# Patient Record
Sex: MALE | Race: WHITE
[De-identification: names, ages, dates, MRNs, and addresses within clinical notes are randomized per-mention and may not be internally consistent; named-entity substitution may affect disease eponyms.]

---

## 2018-03-10 ENCOUNTER — HOSPITAL ENCOUNTER (EMERGENCY)
Dept: HOSPITAL 80 - FED | Age: 38
Discharge: HOME | End: 2018-03-10
Payer: COMMERCIAL

## 2018-03-10 VITALS — SYSTOLIC BLOOD PRESSURE: 111 MMHG | HEART RATE: 87 BPM | DIASTOLIC BLOOD PRESSURE: 79 MMHG | RESPIRATION RATE: 18 BRPM

## 2018-03-10 VITALS — OXYGEN SATURATION: 96 %

## 2018-03-10 VITALS — TEMPERATURE: 97.5 F

## 2018-03-10 DIAGNOSIS — F17.200: ICD-10-CM

## 2018-03-10 DIAGNOSIS — R07.9: Primary | ICD-10-CM

## 2018-03-10 LAB — PLATELET # BLD: 251 10^3/UL (ref 150–400)

## 2018-03-10 NOTE — EDPHY
H & P


Stated Complaint: chest and back pain and SOB starting at 0530





- Personal History


Current Tetanus/Diphtheria Vaccine: Yes


Current Tetanus Diphtheria and Acellular Pertussis (TDAP): Yes


Tetanus Vaccine Date: < 10 years





- Medical/Surgical History


Hx Asthma: No


Hx Chronic Respiratory Disease: No


Hx Diabetes: No


Hx Cardiac Disease: No


Hx Renal Disease: No


Hx Cirrhosis: No


Hx Alcoholism: No


Hx HIV/AIDS: No


Hx Splenectomy or Spleen Trauma: No


Other PMH: lung issues.  depression, PTSD





- Social History


Smoking Status: Current every day smoker


Time Seen by Provider: 03/10/18 10:54


HPI/ROS: 





Chief complaint:  Chest pain





History of present illness:  This is a 38-year-old male who presents to the 

emergency department for evaluation of chest pain.  He reports the onset of 

symptoms this morning.  In addition to the pain in his chest he has some back 

pain between his scapula.  He has had associated shortness of breath.  He 

denies precipitating factors.  He denies alleviating or aggravating factors.  

He describes it as a squeezing pain.  He denies other associated signs or 

symptoms including no fevers, no cold symptoms, no nausea or vomiting, no pain 

or swelling in the legs.  He has never had similar.





Review of systems:  A 10 point review of systems was obtained and other than 

described above was negative (John Moreno)





- Physical Exam


Exam: 





General Appearance: Alert, nontoxic.


Eyes: Pupils equal and round no pallor or injection.


ENT, Mouth: Mucous membranes moist.


Respiratory: There are no retractions, lungs are clear to auscultation.


Cardiovascular:  Regular rate and rhythm.


Gastrointestinal:  Abdomen is soft and non tender, no masses, bowel sounds 

normal.


Neurological:  Alert and oriented x4.  Cranial nerves 2-12 grossly intact.  

Strength and sensation intact and symmetrical.


Skin: Warm and dry, no rashes.


Musculoskeletal: Neck is supple non tender. Extremities are symmetrical, full 

range of motion.


Psychiatric: Patient is oriented X 3, there is no agitation.


 (John Moreno)


Constitutional: 


 Initial Vital Signs











Temperature (C)  36.4 C   03/10/18 10:45


 


Heart Rate  88   03/10/18 10:45


 


Respiratory Rate  20   03/10/18 10:45


 


Blood Pressure  123/81 H  03/10/18 10:45


 


O2 Sat (%)  97   03/10/18 10:45








 











O2 Delivery Mode               Room Air














Allergies/Adverse Reactions: 


 





No Known Allergies Allergy (Unverified 03/10/18 10:45)


 








Home Medications: 














 Medication  Instructions  Recorded


 


Prozac 20 MG (*)  03/10/18


 


Seroquel  03/10/18














Medical Decision Making





- Diagnostics


Imaging: Discussed imaging studies w/ On call Radiologist





- Diagnostics


EKG Interpretation: 





EKG:  Complete interpretation has been separately recorded in the [facility] 

archive.  Summary impression:  Sinus rhythm, rate 91 (Nawaf Renee)


Imaging Results: 


 Imaging Impressions





Chest X-Ray  03/10/18 11:00


Impression: Normal chest x-ray.








Chest/Thorax CTA  03/10/18 12:18


Impression: 


1. No evidence of pulmonary embolus using CT protocol.


2. Thickening and distention of the esophagus. Consider underlying reflux and 

esophagitis.


3. Mild anterior wedging superior endplate of T5, T6, and T8. Clinical 

correlation recommended with respect to any prior trauma to account for these 

findings. If indicated, consider DEXA scan at some point to evaluate underlying 

bone mineral density. 


 


Findings discussed with AISSATOU Barry  at  13:08 hour, 3/10/2018.


 


 











ED Course/Re-evaluation: 





Patient is discussed with my secondary supervising physician Dr. Daniel Renee.  

Patient presents to the emergency department for chest and back discomfort.  He 

is nontoxic.  Vital signs are stable.  Initial blood studies are unremarkable 

except D-dimer which is elevated.  A CTA is performed.  He does have a 

thickened esophagus I do believe this would be consistent with his symptoms.  

Some mild wedging of T5, T6, T8.  He denies any actual spine pain.  No history 

of recent trauma.  He is given a GI cocktail with improvement in symptoms.  He 

will be discharged home.  Asked to take Zantac regularly.  He is asked to 

follow up with a primary care doctor for recheck.  Return precautions are given.

 (John Moreno)


Differential Diagnosis: 





Included but not limited to reflux, esophagitis, pulmonary infections, 

pneumothorax, cardiac dysrhythmia, ACS, pulmonary embolism (John Moreno)





- Data Points


Laboratory Results: 


 Laboratory Results





 03/10/18 11:20 





 03/10/18 11:20 





 











  03/10/18 03/10/18 03/10/18





  11:20 11:20 11:20


 


WBC      11.10 10^3/uL H 10^3/uL





     (3.80-9.50) 


 


RBC      5.32 10^6/uL 10^6/uL





     (4.40-6.38) 


 


Hgb      13.7 g/dL g/dL





     (13.7-17.5) 


 


Hct      41.2 % %





     (40.0-51.0) 


 


MCV      77.4 fL L fL





     (81.5-99.8) 


 


MCH      25.8 pg L pg





     (27.9-34.1) 


 


MCHC      33.3 g/dL g/dL





     (32.4-36.7) 


 


RDW      16.0 % H %





     (11.5-15.2) 


 


Plt Count      251 10^3/uL 10^3/uL





     (150-400) 


 


MPV      10.6 fL fL





     (8.7-11.7) 


 


Neut % (Auto)      72.3 % %





     (39.3-74.2) 


 


Lymph % (Auto)      16.9 % %





     (15.0-45.0) 


 


Mono % (Auto)      9.2 % %





     (4.5-13.0) 


 


Eos % (Auto)      0.6 % %





     (0.6-7.6) 


 


Baso % (Auto)      0.3 % %





     (0.3-1.7) 


 


Nucleat RBC Rel Count      0.0 % %





     (0.0-0.2) 


 


Absolute Neuts (auto)      8.02 10^3/uL H 10^3/uL





     (1.70-6.50) 


 


Absolute Lymphs (auto)      1.88 10^3/uL 10^3/uL





     (1.00-3.00) 


 


Absolute Monos (auto)      1.02 10^3/uL H 10^3/uL





     (0.30-0.80) 


 


Absolute Eos (auto)      0.07 10^3/uL 10^3/uL





     (0.03-0.40) 


 


Absolute Basos (auto)      0.03 10^3/uL 10^3/uL





     (0.02-0.10) 


 


Absolute Nucleated RBC      0.00 10^3/uL 10^3/uL





     (0-0.01) 


 


Immature Gran %      0.7 % %





     (0.0-1.1) 


 


Immature Gran #      0.08 10^3/uL 10^3/uL





     (0.00-0.10) 


 


D-Dimer  2.51 ug/mLFEU H ug/mLFEU    





   (0.00-0.50)   


 


Sodium    142 mEq/L mEq/L  





    (135-145)  


 


Potassium    3.8 mEq/L mEq/L  





    (3.5-5.2)  


 


Chloride    103 mEq/L mEq/L  





    ()  


 


Carbon Dioxide    25 mEq/l mEq/l  





    (22-31)  


 


Anion Gap    14 mEq/L mEq/L  





    (8-16)  


 


BUN    24 mg/dL H mg/dL  





    (7-23)  


 


Creatinine    1.1 mg/dL mg/dL  





    (0.7-1.3)  


 


Estimated GFR    > 60   





    


 


Glucose    98 mg/dL mg/dL  





    ()  


 


Calcium    8.8 mg/dL mg/dL  





    (8.5-10.4)  


 


Troponin I    < 0.012 ng/mL ng/mL  





    (0.000-0.034)  











Medications Given: 


 








Discontinued Medications





Al Hydroxide/Mg Hydroxide (Maalox Susp)  30 ml PO ONCE ONE


   Stop: 03/10/18 13:50


   Last Admin: 03/10/18 13:53 Dose:  30 ml


Hyoscyamine Sulfate (Levsin, Hyomax-Sl)  0.25 mg PO ONCE ONE


   Stop: 03/10/18 13:50


   Last Admin: 03/10/18 13:54 Dose:  0.25 mg


Lidocaine (Lidocaine 2% Viscous)  15 ml PO ONCE ONE


   Stop: 03/10/18 13:50


   Last Admin: 03/10/18 13:54 Dose:  15 ml


Morphine Sulfate (Morphine)  4 mg IVP EDNOW ONE


   Stop: 03/10/18 11:02


   Last Admin: 03/10/18 11:20 Dose:  4 mg


Morphine Sulfate (Morphine)  4 mg IVP EDNOW ONE


   Stop: 03/10/18 12:03


   Last Admin: 03/10/18 12:11 Dose:  4 mg


Ondansetron HCl (Zofran)  4 mg IVP EDNOW ONE


   Stop: 03/10/18 11:20


   Last Admin: 03/10/18 11:20 Dose:  4 mg








Departure





- Departure


Disposition: Home, Routine, Self-Care


Clinical Impression: 


 Chest pain





Condition: Good


Instructions:  Chest Pain (ED)


Additional Instructions: 


Follow-up with your primary care doctor for recheck.  Please have them recheck 

all findings from the emergency room





Take Zantac 150 mg twice daily for the next 2 weeks





If symptoms worsen or new symptoms develop return to the emergency room for 

recheck


Referrals: 


[facility] [Primary Care Provider] - As per Instructions

## 2018-03-10 NOTE — CPEKG
Heart Rate: 91

RR Interval: 659

P-R Interval: 140

QRSD Interval: 84

QT Interval: 360

QTC Interval: 443

P Axis: 57

QRS Axis: 41

T Wave Axis: 10

EKG Severity - NORMAL ECG -

EKG Impression: SINUS RHYTHM

Electronically Signed By: Nawaf Renee 10-Mar-2018 11:16:51

## 2018-03-12 ENCOUNTER — HOSPITAL ENCOUNTER (EMERGENCY)
Dept: HOSPITAL 80 - FED | Age: 38
Discharge: HOME | End: 2018-03-12
Payer: COMMERCIAL

## 2018-03-12 VITALS
HEART RATE: 84 BPM | SYSTOLIC BLOOD PRESSURE: 122 MMHG | DIASTOLIC BLOOD PRESSURE: 65 MMHG | RESPIRATION RATE: 16 BRPM | TEMPERATURE: 98.6 F | OXYGEN SATURATION: 94 %
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
## 2018-03-12 DIAGNOSIS — S29.012A: Primary | ICD-10-CM

## 2018-03-12 DIAGNOSIS — F17.200: ICD-10-CM

## 2018-03-12 NOTE — EDPHY
H & P


Time Seen by Provider: 03/12/18 15:49


HPI/ROS: 





CHIEF COMPLAINT:  Continued thoracic back pain





HISTORY OF PRESENT ILLNESS:  30-year-old male seen emergency department 2 days 

ago for complaints of thoracic back pain and chest pain which of he had an 

elevated D-dimer, subsequently had CT angiography of the chest which was 

negative for PE or vasculopathy.  He has been taking ibuprofen with mild relief 

of symptoms.  Complaining of thoracic back pain in the paraspinous region which 

is reproducible with movement.  Nonpleuritic.  No trauma.  No syncope no near 

syncope.  No peripheral paresthesia, weakness, numbness.





PRIMARY CARE PROVIDER: Panchito Rinaldi 





REVIEW OF SYSTEMS:


A ten point review of systems was performed and is negative with the exception 

of the items mentioned in the HPI








PAST MEDICAL & SURGICAL  HISTORY:  No pertinent medical or surgical history    





SOCIAL HISTORY: works as a   














************


PHYSICAL EXAM





(Prior to examination, patient consented to physical exam, hands were washed 

and my usual and customary physical exam procedures followed)


1) GENERAL: Well-developed, well-nourished, alert and oriented.  Appears 

uncomfortable when he is asked to sit up..


2) HEAD: Normocephalic, atraumatic


3) HEENT: Pupils equal, round, reactive to light bilaterally.  Sclera 

anicteric. 


4) NECK: Full range of motion, no meningeal signs.


5) LUNGS: Clear auscultation bilaterally, no wheezes, no rhonchi, no 

retractions.   


6) HEART: Regular rate and rhythm, no murmur, no heave, no gallop.


7) ABDOMEN: No guarding, no rebound, no focal tenderness n,


8) MUSCULOSKELETAL: Moving all extremities, no focal areas of tenderness, no 

obvious trauma.  No peripheral edema or discoloration.


9) BACK: No CVA tenderness, no midline vertebral tenderness, no fluctuance, no 

step-off, no obvious trauma, no visual or palpable abnormality. 


10) SKIN: No rash, no petechiae. 


11) Psychiatric:  Patient is oriented X 3, there is no agitation.


12) NEURO: Awake, alert, and oriented to person, place and time.  Answers 

questions appropriately.  There were no obvious focal neurologic abnormalities.

  No cerebellar dysfunction.  Normal steady gait.  Upper and lower extremities 

bilaterally with strength 5 / 5, reflexes 2+.





***************





DIFFERENTIAL DIAGNOSIS:   In no particular include but limited to spinal 

infectious etiology, acute muscular strain, discogenic etiology





Smoking Status: Current every day smoker


Constitutional: 





 Initial Vital Signs











Temperature (C)  37 C   03/12/18 14:40


 


Heart Rate  84   03/12/18 14:40


 


Respiratory Rate  16   03/12/18 14:40


 


Blood Pressure  122/65 H  03/12/18 14:40


 


O2 Sat (%)  94   03/12/18 14:40








 











O2 Delivery Mode               Room Air














Allergies/Adverse Reactions: 


 





No Known Allergies Allergy (Verified 03/12/18 14:38)


 








Home Medications: 














 Medication  Instructions  Recorded


 


Prozac 20 MG (*)  03/10/18


 


Seroquel  03/10/18


 


Cyclobenzaprine [Flexeril 10 MG 10 mg PO TID #15 tab 03/12/18





(RX)]  


 


Lidocaine [Lidoderm] 1 each TP BID #30 adh..patch 03/12/18














MDM/Departure





- [facility]


ED Course/Re-evaluation: 





3:54 p.m.:  I reviewed the patient's old medical records including his imaging 

studies.  He had CT angiography of the chest 2 days ago showing no acute 

abnormality, no evidence of dissection or pulmonary embolus.  On exam in the ER 

today he has no midline thoracic spine pain.  He is tender to palpation 

bilateral paraspinous thoracic region.   Discussed his wedge abnormalities seen 

on CT scan however absence of midline pain I think that this is less than 

likely specific etiology of his pain.We discussed that I think his symptoms arm 

more than likely secondary to acute muscular strain.  Plan will be discharged 

with Lidoderm patches, Flexeril, cold packs and follow up at [facility].  He feels 

comfortable with this plan.  All questions and concerns addressed by myself.  

Care of patient under supervision of secondary supervising physician Dr Crisostomo. 





- Depart


Disposition: Home, Routine, Self-Care


Clinical Impression: 


 Strain of muscle and tendon of back wall of thorax, initial encounter





Condition: Good


Instructions:  Thoracic Back Strain (ED)


Additional Instructions: 


Seek medical attention if you develop new or worsening pain, if you develop 

bladder or bowel dysfunction, numbness around your perineum, foot drop, or any 

other symptoms that concern you. 


Prescriptions: 


Cyclobenzaprine [Flexeril 10 MG (RX)] 10 mg PO TID #15 tab


Lidocaine [Lidoderm] 1 each TP BID #30 adh..patch


Referrals: 


[facility] [Provider Group] - 2-3 days, call for appt.

## 2018-03-28 ENCOUNTER — HOSPITAL ENCOUNTER (EMERGENCY)
Dept: HOSPITAL 80 - FED | Age: 38
Discharge: HOME | End: 2018-03-28
Payer: MEDICAID

## 2018-03-28 VITALS — DIASTOLIC BLOOD PRESSURE: 81 MMHG | SYSTOLIC BLOOD PRESSURE: 115 MMHG

## 2018-03-28 DIAGNOSIS — R56.9: Primary | ICD-10-CM

## 2018-03-28 LAB — PLATELET # BLD: 277 10^3/UL (ref 150–400)

## 2018-03-28 NOTE — CPEKG
Heart Rate: 113

RR Interval: 531

P-R Interval: 124

QRSD Interval: 82

QT Interval: 320

QTC Interval: 439

P Axis: 56

QRS Axis: 53

T Wave Axis: -2

EKG Severity - BORDERLINE ECG -

EKG Impression: SINUS TACHYCARDIA

EKG Impression: BORDERLINE T ABNORMALITIES, INFERIOR LEADS

Electronically Signed By: Kaylyn Crisostomo 29-Mar-2018 22:44:22

## 2018-03-28 NOTE — EDPHY
HPI/HX/ROS/PE/MDM


Narrative: 





CHIEF COMPLAINT: Seizure 





HPI: 


This patient is a 38 year old male arriving via EMS following a witnessed 

seizure this morning. His wife heard a crashing sound, which woke her from 

sleep. She found the patient rigid on the ground with tonic-clonic seizure 

activity. This lasted about 3.5 to 4 minutes. She states that as he became less 

rigid, he continued "seizing and grunting" for another minute. The patient had 

an episode last night of confusion and disorientation while at work, and he is 

not sure if he had another seizure at that time. He was evaluated 20 years ago 

at age 19 for possible seizures for which he took antiepileptics, but a 

neurologist at that time found no evidence of seizure disorder and he was taken 

off his seizure medication. Currently, the patient continues to feel slightly 

confused. He denies benzodiazepine or alcohol use. Per EMS report, BGL was 

within normal limits, vitals stable in transport.He denies fever, headache, 

neck pain, chest pain, shortness of breath, weakness, or other associated 

symptoms. 





REVIEW OF SYSTEMS:


Aside from elements discussed in the HPI, a comprehensive 10-point review of 

systems was reviewed and is negative.





PMH: Depression, PTSD (Seroquel, Prozac). 





SOCIAL HISTORY: . Wife at bedside. Recently moved to [place]. Employed. 





PHYSICAL EXAM:


General:Patient is alert, in no acute distress.


ENT:Eyes are normal to inspection. Lateral tongue abrasions. ENT inspection 

otherwise normal.


Neck: Normal inspection.  Full range of motion.


Respiratory:No respiratory distress.  Breath sounds normal bilaterally.


Cardiovascular: Regular rate and rhythm.  Strong peripheral pulses.  Normal cap 

refill.


Abdomen:The abdomen is nontender to palpation. There are no peritoneal signs. 

There are normal bowel sounds.


Back: Normal to inspection.  No tenderness to palpation.


Skin: Normal color.  No rash.  Warm and dry.


Extremities: Normal appearance.  Full range of motion.


Neuro: Oriented x3.  Normal motor function.  Normal sensory function.





ED Course: 





09:15 Met EMS at bedside. 





37 y/o male presents following witnessed tonic-clonic seizure activity this 

morning. Exam reveals lateral tongue abrasions. Plan for CT head. Plan for EKG, 

labs including CBC, chemistries, troponin. IV established. Plan to administer 

1L IV NS. 





EKG was ordered and interpreted by myself.  Please see [facility] system for 

official reading. Sinus tachycardia, borderline T abnormalities in inferior 

leads. 





09:47 Spoke with Dr. Oppenheimer, radiologist. CT head negative for acute 

processes. 





10:00 Plan to administer 600mg PO Ibuprofen for pain relief. 





Reviewed laboratory studies. Troponin negative. Plan to consult with 

neurologist on call prior to patient discharge. 





11:18 Spoke with Dr. Smallwood, neurologist. We disussed the fact that patient 

had previously been on an antiepileptic. He will follow up with this patient in 

an outpatient setting. He does not recommending starting medication for seizure 

prevention at this time. 





Reassessed patient and discussed results. Plan to discharge patient home in 

good condition. He will follow up up with neurology as above. Return 

precautions discussed. He is comfortable with this plan. 


MDM: 





This patient presents with witnessed tonic-clonic seizure in setting of 

previous history of seizures, not currently on meds. CTH is negative for 

abnormalities.  Patient has returned to normal mental status and there are no 

signs of trauma.  I think he is safe for outpatient management.  He was advised 

not to drive etc. 





- Data Points


Imaging Results: 


 Imaging Impressions





Head CT  03/28/18 09:18


Impression: 1. Head CT within normal limits. No seizure focus identified.


2. Right maxillary sinus air-fluid level of undetermined etiology.


 


Results called and discussed with Jc Preston MD, at 3/28/2018 9:47


 


General information for patients regarding this examination can be found at 

[email or web address].


 


If you have questions or comments about this report, please contact me at [phone] (hospital) or [phone] (cell). 











Imaging: Discussed imaging studies w/ On call Radiologist


Laboratory Results: 


 Laboratory Results





 03/28/18 09:15 





 03/28/18 09:15 





 











  03/28/18 03/28/18





  09:15 09:15


 


WBC    11.20 10^3/uL H 10^3/uL





    (3.80-9.50) 


 


RBC    5.24 10^6/uL 10^6/uL





    (4.40-6.38) 


 


Hgb    13.7 g/dL g/dL





    (13.7-17.5) 


 


Hct    43.2 % %





    (40.0-51.0) 


 


MCV    82.4 fL fL





    (81.5-99.8) 


 


MCH    26.1 pg L pg





    (27.9-34.1) 


 


MCHC    31.7 g/dL L g/dL





    (32.4-36.7) 


 


RDW    16.2 % H %





    (11.5-15.2) 


 


Plt Count    277 10^3/uL 10^3/uL





    (150-400) 


 


MPV    10.4 fL fL





    (8.7-11.7) 


 


Neut % (Auto)    72.5 % %





    (39.3-74.2) 


 


Lymph % (Auto)    17.3 % %





    (15.0-45.0) 


 


Mono % (Auto)    6.4 % %





    (4.5-13.0) 


 


Eos % (Auto)    1.8 % %





    (0.6-7.6) 


 


Baso % (Auto)    0.4 % %





    (0.3-1.7) 


 


Nucleat RBC Rel Count    0.0 % %





    (0.0-0.2) 


 


Absolute Neuts (auto)    8.11 10^3/uL H 10^3/uL





    (1.70-6.50) 


 


Absolute Lymphs (auto)    1.94 10^3/uL 10^3/uL





    (1.00-3.00) 


 


Absolute Monos (auto)    0.72 10^3/uL 10^3/uL





    (0.30-0.80) 


 


Absolute Eos (auto)    0.20 10^3/uL 10^3/uL





    (0.03-0.40) 


 


Absolute Basos (auto)    0.05 10^3/uL 10^3/uL





    (0.02-0.10) 


 


Absolute Nucleated RBC    0.00 10^3/uL 10^3/uL





    (0-0.01) 


 


Immature Gran %    1.6 % H %





    (0.0-1.1) 


 


Immature Gran #    0.18 10^3/uL H 10^3/uL





    (0.00-0.10) 


 


Sodium  144 mEq/L mEq/L  





   (135-145)  


 


Potassium  4.4 mEq/L mEq/L  





   (3.5-5.2)  


 


Chloride  104 mEq/L mEq/L  





   ()  


 


Carbon Dioxide  17 mEq/l L mEq/l  





   (22-31)  


 


Anion Gap  23 mEq/L H mEq/L  





   (8-16)  


 


BUN  24 mg/dL H mg/dL  





   (7-23)  


 


Creatinine  1.1 mg/dL mg/dL  





   (0.7-1.3)  


 


Estimated GFR  > 60   





   


 


Glucose  133 mg/dL H mg/dL  





   ()  


 


Calcium  8.7 mg/dL mg/dL  





   (8.5-10.4)  


 


Troponin I  < 0.012 ng/mL ng/mL  





   (0.000-0.034)  











Medications Given: 


 








Discontinued Medications





Sodium Chloride (Ns)  1,000 mls @ 0 mls/hr IV ONCE ONE


   PRN Reason: Wide Open


   Stop: 03/28/18 09:24


   Last Admin: 03/28/18 09:24 Dose:  1,000 mls


Ibuprofen (Motrin)  600 mg PO EDNOW ONE


   Stop: 03/28/18 10:01


   Last Admin: 03/28/18 10:45 Dose:  600 mg








General


Initial Vital Signs: 


 Initial Vital Signs











Temperature (C)  36.6 C   03/28/18 09:13


 


Heart Rate  120 H  03/28/18 09:13


 


Respiratory Rate  18   03/28/18 09:13


 


Blood Pressure  134/86 H  03/28/18 09:13


 


O2 Sat (%)  95   03/28/18 09:13








 











O2 Delivery Mode               Room Air














Allergies/Adverse Reactions: 


 





No Known Allergies Allergy (Verified 03/12/18 14:38)


 








Home Medications: 














 Medication  Instructions  Recorded


 


Prozac 20 MG (*)  03/10/18


 


Seroquel  03/10/18














Departure





- Departure


Disposition: Home, Routine, Self-Care


Clinical Impression: 


 Seizure





Condition: Good


Instructions:  New-Onset Seizure in Adults (ED)


Additional Instructions: 


1. Follow-up with a neurologist within 72 hours. Additional testing will likely 

need to be done for evaluation of your seizure.





2. Return to the Emergency Department for recurrent seizure, fever, headache, 

confusion, numbness, weakness, or other concerns.





3. No driving or dangerous activity such as swimming in a pool or riding a ski 

lift which could put you or someone else a danger in the event of recurrent 

seizure until you are cleared by a neurologist.


Referrals: 


Chapito Smallwood DO [Doctor of Osteopathy] - As per Instructions


Report Scribed for: Jc Preston


Report Scribed by: Fallon Yeh


Date of Report: 03/28/18


Time of Report: 10:27


Physician Review and Approval Statement: Portions of this note were transcribed 

by an ED scribe.  I personally performed the history, physical exam, and 

medical decision making; and confirm the accuracy of the information in the 

transcribed note.

## 2018-03-29 ENCOUNTER — HOSPITAL ENCOUNTER (EMERGENCY)
Dept: HOSPITAL 80 - FED | Age: 38
Discharge: HOME | End: 2018-03-29
Payer: MEDICAID

## 2018-03-29 VITALS — DIASTOLIC BLOOD PRESSURE: 89 MMHG | SYSTOLIC BLOOD PRESSURE: 125 MMHG
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
## 2018-03-29 DIAGNOSIS — F17.200: ICD-10-CM
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
## 2018-03-29 DIAGNOSIS — M79.1: ICD-10-CM
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
## 2018-03-29 DIAGNOSIS — S00.512A: Primary | ICD-10-CM

## 2018-03-29 NOTE — EDPHY
H & P


Time Seen by Provider: 03/29/18 19:56


HPI/ROS: 





CHIEF COMPLAINT:  Sore all over, tongue swelling after seizure yesterday





HISTORY OF PRESENT ILLNESS:  38-year-old male presents with multiple 

complaints.  He had a generalized seizure yesterday and bit his tongue.  Since 

then he has had difficulty speaking because of tongue swelling and states that 

his family is unable to understand is speaking.  He also complains of diffuse 

myalgias.  He is taking ibuprofen 800 mg every 8 hr for myalgias with some 

relief.  He also has a fuzzy feeling in his brain and feels that he needs to 

take an extra 1-2 seconds to respond to questions.  No headache and no 

recurrent seizure.  He also feels somewhat depressed because he has been unable 

to see his daughter who lives in another [place].  Denies suicidal or homicidal 

ideation.





REVIEW OF SYSTEMS:


Constitutional:  No fever, no chills


Eyes:  No visual changes


ENT:  No sore throat


Respiratory:  No cough, no shortness of breath


Cardiac:  No chest pain


Gastrointestinal:  No nausea, no vomiting, no abdominal pain


Genitourinary:  no dysuria


Skin:  No rash


Neurological:  No headache, no numbness, no weakness


Psychiatric:  depression





Past Medical/Surgical History: 





Seizure disorder


Depression





Social History: 











Smoking Status: Current every day smoker


Physical Exam: 





General Appearance:  Alert, pleasant, speech is clear


Eyes:  Pupils equal and round, no conjunctival pallor or injection


ENT, Mouth:  Mucous membranes moist


Neck:  Normal inspection


Respiratory:  Lungs are clear to auscultation


Cardiovascular:  Regular rate and rhythm


Gastrointestinal:  Abdomen is soft and nontender


Neurological:  Alert, oriented x3, cranial nerves II through XII intact, motor 5

/5, sensory intact to light touch, normal gait.


Skin:  Warm and dry


Extremities:  Diffuse mild tenderness, no swelling


Psychiatric:  Mood and affect normal, normal thought process





Constitutional: 





 Initial Vital Signs











Temperature (C)  36.6 C   03/29/18 20:00


 


Heart Rate  82   03/29/18 20:00


 


Respiratory Rate  18   03/29/18 20:00


 


Blood Pressure  138/96 H  03/29/18 20:00


 


O2 Sat (%)  97   03/29/18 20:00








 











O2 Delivery Mode               Room Air














Allergies/Adverse Reactions: 


 





No Known Allergies Allergy (Verified 03/12/18 14:38)


 








Home Medications: 














 Medication  Instructions  Recorded


 


Prozac 20 MG (*)  03/10/18


 


Seroquel  03/10/18














Medical Decision Making


ED Course/Re-evaluation: 





This patient presents with a tongue abrasion and myalgias after a generalized 

seizure yesterday.  Old medical record from yesterday's ED visit reviewed by 

me.  His speech is clearly understandable and the tongue abrasion is minor.  He 

also has diffuse myalgias after the seizure.  I do not suspect rhabdomyolysis 

and do not feel that testing is indicated.  I have encouraged him to take 

ibuprofen as needed for myalgias.





Departure





- Departure


Disposition: Home, Routine, Self-Care


Clinical Impression: 


 Myalgia





Abrasion of tongue


Qualifiers:


 Encounter type: initial encounter Qualified Code(s): S00.512A - Abrasion of 

oral cavity, initial encounter





Condition: Good


Instructions:  Musculoskeletal Pain (ED)


Referrals: 


Sloan Garcia MD [Medical Doctor] - As per Instructions


[facility],. [Clinic] - As per Instructions

## 2018-04-10 ENCOUNTER — HOSPITAL ENCOUNTER (OUTPATIENT)
Dept: HOSPITAL 80 - FCPNEURO | Age: 38
End: 2018-04-10
Attending: PSYCHIATRY & NEUROLOGY
Payer: MEDICAID

## 2018-04-10 DIAGNOSIS — R56.9: Primary | ICD-10-CM

## 2018-04-10 NOTE — CPEEG
[f 
rep st]



                                                      ELECTROENCEPHALOGRAM





ELECTROENCEPHALOGRAM



DATE OF STUDY:  04/10/2018



DATE OF INTERPRETATION:  04/10/2018.





INTERPRETATION:  Normal EEG during wakefulness and brief drowsiness.  There 
were no potentially epileptogenic abnormalities present in the recording.  A 
repeat 4-hour video EEG study may be helpful, if clinically indicated.



REPORT:  This EEG contains 9 Hz alpha activity over the posterior head regions.
  There was no abnormal activation at rest, during photic stimulation or 
hyperventilation.  The patient became drowsy very briefly during the study.  
There was no abnormal activation during brief drowsiness or during times of 
arousal.  The patient did not fall asleep during the study. 



A repeat 4-hour video EEG may be helpful to capture sleep, if clinically 
indicated.





Job #:  [id number]

MTDD

## 2018-04-16 ENCOUNTER — HOSPITAL ENCOUNTER (OUTPATIENT)
Dept: HOSPITAL 80 - FIMAGING | Age: 38
End: 2018-04-16
Attending: PSYCHIATRY & NEUROLOGY
Payer: MEDICAID

## 2018-04-16 DIAGNOSIS — R56.9: Primary | ICD-10-CM

## 2018-04-16 PROCEDURE — A9585 GADOBUTROL INJECTION: HCPCS

## 2018-05-02 ENCOUNTER — HOSPITAL ENCOUNTER (EMERGENCY)
Dept: HOSPITAL 80 - FED | Age: 38
Discharge: HOME | End: 2018-05-02
Payer: MEDICAID

## 2018-05-02 VITALS — DIASTOLIC BLOOD PRESSURE: 72 MMHG | SYSTOLIC BLOOD PRESSURE: 126 MMHG

## 2018-05-02 DIAGNOSIS — F17.200: ICD-10-CM

## 2018-05-02 DIAGNOSIS — G40.909: Primary | ICD-10-CM

## 2018-05-02 LAB — PLATELET # BLD: 277 10^3/UL (ref 150–400)

## 2018-05-02 NOTE — ASMTCMCOM
CM Note

 

CM Note                       

Notes:

Patient expressed challenges in finding transportation to a neurology appointment in [place]. I sent 


his demographics to our Medicaid  and requested that she contact patient with 

information about Medicaid transportation. 



I gave patient and his wife bus vouchers so that they could get home from the hospital. They will 

wait to hear from [place] regarding Medicaid transport options. 

 

Date Signed:  05/02/2018 02:29 PM

Electronically Signed By:Yahaira Francisco RN

## 2018-05-02 NOTE — EDPHY
H & P


Time Seen by Provider: 05/02/18 13:06


HPI/ROS: 





CHIEF COMPLAINT:  Seizure





HISTORY OF PRESENT ILLNESS:  The patient presents the ED after reported 

witnessed recurrent seizure.  The patient has a history of seizure disorder.  

He recently restarted Keppra.  The patient has had follow up with Dr. Smallwood 

from Neurology.  The patient has not been compliant with taking his Keppra 

according to Dr. Smallwood.  The patient tells me that he did miss his morning 

dose.  There is no history of a fall or trauma.  The patient denies fever, 

cough or congestion.  He denies additional acute complaints.





REVIEW OF SYSTEMS:


A comprehensive 10 point review of systems is otherwise negative aside from 

elements mentioned in the history of present illness.








Source: Patient


Exam Limitations: No limitations





- Personal History


Tetanus Vaccine Date: < 10 years





- Medical/Surgical History


Hx Asthma: No


Hx Chronic Respiratory Disease: No


Hx Diabetes: No


Hx Cardiac Disease: No


Hx Renal Disease: No


Hx Cirrhosis: No


Hx Alcoholism: No


Hx HIV/AIDS: No


Hx Splenectomy or Spleen Trauma: No


Other PMH: lung issues.  depression, PTSD, seizures,





- Social History


Smoking Status: Current every day smoker





- Physical Exam


Exam: 








General Appearance:  Alert, no distress


Eyes:  Pupils equal and round no pallor or injection


ENT, Mouth:  Mucous membranes moist


Respiratory:  There are no retractions, lungs are clear to auscultation


Cardiovascular:  Regular rate and rhythm


Gastrointestinal:  Abdomen is soft and nontender, no masses, bowel sounds normal


Neurological:  A&O, normal motor function, normal sensory exam, normal cranial 

nerves


Skin:  Warm and dry, no rashes


Musculoskeletal:  Neck is supple nontender


Extremities:  symmetrical, full range of motion








Constitutional: 


 Initial Vital Signs











Temperature (C)  37.1 C   05/02/18 13:06


 


Heart Rate  104 H  05/02/18 13:06


 


Respiratory Rate  16   05/02/18 13:06


 


Blood Pressure  133/81 H  05/02/18 13:06


 


O2 Sat (%)  94   05/02/18 13:06








 











O2 Delivery Mode               Room Air














Allergies/Adverse Reactions: 


 





No Known Allergies Allergy (Verified 03/12/18 14:38)


 








Home Medications: 














 Medication  Instructions  Recorded


 


Prozac 20 MG (*)  03/10/18


 


Seroquel  03/10/18


 


Baclofen [Baclofen 10 mg (*)]  05/02/18


 


Keppra  05/02/18


 


Meloxicam  05/02/18














Medical Decision Making


ED Course/Re-evaluation: 





I reviewed the patient's outpatient medical records including an MRI performed 

earlier this month for recurrent seizure.  It demonstrated no evidence of acute 

disease.





The patient was given a dose of his Keppra in the ED.





I reviewed with Dr. John who informs me the patient is not a reliable and 

likely is non-compliant.





The patient will be discharged from the emergency department.  I have stressed 

to him the importance of taking his Keppra twice a day as directed.  He should 

follow up with his neurologist as scheduled.








Differential Diagnosis: 





Differential diagnosis considered includes dehydration, pseudo-seizure, epilepsy

, status epilepticus





- Data Points


Laboratory Results: 


 Laboratory Results





 05/02/18 12:45 





 05/02/18 12:45 





 











  05/02/18 05/02/18





  12:45 12:45


 


WBC    9.09 10^3/uL 10^3/uL





    (3.80-9.50) 


 


RBC    5.56 10^6/uL 10^6/uL





    (4.40-6.38) 


 


Hgb    14.5 g/dL g/dL





    (13.7-17.5) 


 


Hct    46.4 % %





    (40.0-51.0) 


 


MCV    83.5 fL fL





    (81.5-99.8) 


 


MCH    26.1 pg L pg





    (27.9-34.1) 


 


MCHC    31.3 g/dL L g/dL





    (32.4-36.7) 


 


RDW    15.2 % %





    (11.5-15.2) 


 


Plt Count    277 10^3/uL 10^3/uL





    (150-400) 


 


MPV    10.3 fL fL





    (8.7-11.7) 


 


Neut % (Auto)    59.0 % %





    (39.3-74.2) 


 


Lymph % (Auto)    27.9 % %





    (15.0-45.0) 


 


Mono % (Auto)    8.6 % %





    (4.5-13.0) 


 


Eos % (Auto)    2.1 % %





    (0.6-7.6) 


 


Baso % (Auto)    0.7 % %





    (0.3-1.7) 


 


Nucleat RBC Rel Count    0.0 % %





    (0.0-0.2) 


 


Absolute Neuts (auto)    5.37 10^3/uL 10^3/uL





    (1.70-6.50) 


 


Absolute Lymphs (auto)    2.54 10^3/uL 10^3/uL





    (1.00-3.00) 


 


Absolute Monos (auto)    0.78 10^3/uL 10^3/uL





    (0.30-0.80) 


 


Absolute Eos (auto)    0.19 10^3/uL 10^3/uL





    (0.03-0.40) 


 


Absolute Basos (auto)    0.06 10^3/uL 10^3/uL





    (0.02-0.10) 


 


Absolute Nucleated RBC    0.00 10^3/uL 10^3/uL





    (0-0.01) 


 


Immature Gran %    1.7 % H %





    (0.0-1.1) 


 


Immature Gran #    0.15 10^3/uL H 10^3/uL





    (0.00-0.10) 


 


Sodium  149 mEq/L H mEq/L  





   (135-145)  


 


Potassium  4.3 mEq/L mEq/L  





   (3.5-5.2)  


 


Chloride  104 mEq/L mEq/L  





   ()  


 


Carbon Dioxide  16 mEq/l L mEq/l  





   (22-31)  


 


Anion Gap  29 mEq/L H mEq/L  





   (8-16)  


 


BUN  20 mg/dL mg/dL  





   (7-23)  


 


Creatinine  1.3 mg/dL mg/dL  





   (0.7-1.3)  


 


Estimated GFR  > 60   





   


 


Glucose  80 mg/dL mg/dL  





   ()  


 


Calcium  9.5 mg/dL mg/dL  





   (8.5-10.4)  














Departure





- Departure


Disposition: Home, Routine, Self-Care


Clinical Impression: 


 Seizure disorder





Condition: Good


Instructions:  Recurrent Seizures in Adults (ED)


Additional Instructions: 


1.  Follow up with your neurologist as scheduled.


2. Take your anti seizure medication twice a day as directed.  It is imperative 

that you do not miss any doses of the medications.





 


Referrals: 


JOSELUIS VELASQUEZ [Other] - As per Instructions


Chapito Smallwood DO [Doctor of Osteopathy] - As per Instructions

## 2018-06-25 ENCOUNTER — HOSPITAL ENCOUNTER (EMERGENCY)
Dept: HOSPITAL 80 - FED | Age: 38
Discharge: HOME | End: 2018-06-25
Payer: MEDICAID

## 2018-06-25 VITALS — DIASTOLIC BLOOD PRESSURE: 76 MMHG | SYSTOLIC BLOOD PRESSURE: 122 MMHG

## 2018-06-25 DIAGNOSIS — F43.10: Primary | ICD-10-CM

## 2018-06-25 DIAGNOSIS — F17.200: ICD-10-CM

## 2018-06-25 LAB — PLATELET # BLD: 263 10^3/UL (ref 150–400)

## 2018-06-25 PROCEDURE — G0480 DRUG TEST DEF 1-7 CLASSES: HCPCS

## 2018-06-25 NOTE — EDPHY
H & P


Stated Complaint: FLASH BACKS/PTSD


Source: Patient





- Personal History


Current Tetanus Diphtheria and Acellular Pertussis (TDAP): Yes


Tetanus Vaccine Date: < 10 years





- Medical/Surgical History


Hx Asthma: No


Hx Chronic Respiratory Disease: No


Hx Diabetes: No


Hx Cardiac Disease: No


Hx Renal Disease: No


Hx Cirrhosis: No


Hx Alcoholism: Yes


Hx HIV/AIDS: No


Hx Splenectomy or Spleen Trauma: No


Other PMH: lung issues.  depression, PTSD, seizures, FX SPINE





- Social History


Smoking Status: Heavy smoker


Time Seen by Provider: 06/25/18 06:43


HPI/ROS: 





HPI





CHIEF COMPLAINT: Anxiety, PTSD, Depression





HISTORY OF PRESENT ILLNESS:  Patient is a 38-year-old male, he suffers from 

depression, as well as anxiety and PTSD, he presents to the emergency room by 

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  This happened approximately an hour ago.  

This altercation upset him he started stating that he felt like his PTS was 

acting up and he felt very anxious and he drove here to the emergency room.  He 

is requesting to speak to mental health.  He additionally reports that he has 

been drinking alcohol more often.  He denies current suicidal ideation but does 

state that he suffers from depression.  He is feeling more depressed and is 

requesting to speak to mental health.  He is a patient [facility].





Past Medical History:  PTSD, anxiety, depression





Past Surgical History:  No recent surgery





Social History:  Lives locally, frequent alcohol use, smokes tobacco, denies 

illicit drugs.





Family History:  Noncontributory








ROS   


REVIEW OF SYSTEMS:


A comprehensive 10 point review of systems is otherwise negative aside from 

elements mentioned in the history of present illness.








Exam   


Constitutional   nontoxic appearing in no acute distress, slightly anxious 

triage nursing summary reviewed, vital signs reviewed, awake/alert. 


Eyes   normal conjunctivae and sclera, EOMI, PERRLA. 


HENT   normal inspection, atraumatic, moist mucus membranes, no epistaxis, neck 

supple/ no meningismus, no raccoon eyes. 


Respiratory   clear to auscultation bilaterally, normal breath sounds, no 

respiratory distress, no wheezing. 


Cardiovascular   rate normal, regular rhythm, no murmur, no edema, distal 

pulses normal. 


Gastrointestinal   soft, non-tender, no rebound, no guarding, normal bowel 

sounds, no distension, no pulsatile mass. 


Genitourinary   no CVA tenderness. 


Musculoskeletal  no midline vertebral tenderness, full range of motion, no calf 

swelling, no tenderness of extremities, no meningismus, good pulses, 

neurovascularly intact.


Skin   pink, warm, & dry, no rash, skin atraumatic. 


Neurologic   awake, alert and oriented x 3, AAOx3, moves all 4 extremities 

equally, motor intact, sensory intact, CN II-XII intact, normal cerebellar, 

normal vision, normal speech. 


Psychiatric  slightly anxious, depressed


Heme/Lymph/Immune   no lymphadenopathy.





Differential Diagnosis:  Includes but is not limited to in a particular order:  

Acute anxiety, PTSD, depression, mood disorder, underlying mental illness.  

Substance abuse.





Medical Decision Making:  Plan for this patient will obtain blood work and drug 

screen for medical clearance.  Patient is voluntary.  He would like to speak to 

mental health.  He is a patient [facility].  He is not suicidal I 

do not feel like he would benefit from a M1 hold.  Will medically clear him and 

then he can meet with mental health for follow-up care in resources.  1 mg 

Ativan p.o. As been ordered as well.





Re-evaluation:








0646:  1 mg p. o. Ativan ordered.


0700:  Signed over to Dr. Renee 7am Shift-change.  Pending blood work drug 

screen.  And would like to speak to mental health.   (Ismael Marquez)


Constitutional: 


 Initial Vital Signs











Temperature (C)  36.5 C   06/25/18 06:29


 


Heart Rate  96   06/25/18 06:29


 


Respiratory Rate  16   06/25/18 06:29


 


Blood Pressure  134/93 H  06/25/18 06:29


 


O2 Sat (%)  97   06/25/18 06:29








 











O2 Delivery Mode               Room Air














Allergies/Adverse Reactions: 


 





No Known Allergies Allergy (Verified 03/12/18 14:38)


 








Home Medications: 














 Medication  Instructions  Recorded


 


Prozac 20 MG (*)  03/10/18


 


Seroquel  03/10/18


 


Baclofen [Baclofen 10 mg (*)]  05/02/18


 


Keppra  05/02/18


 


Meloxicam  05/02/18














Medical Decision Making


Other Provider: 





I assumed care of the patient at 0700.





The patient was medically cleared for psychiatric evaluation at 8:30 a.m..





The patient was evaluated by [facility].  He would like to go and 

be discharged to attend an alcoholics anonymous meeting.  The patient does 

contract for safety.  He has [facility]  He does not currently meet criteria for a involuntary 

psychiatric hold.  He will be discharged and follow up with alcoholics 

anonymous and [facility] is an outpatient.





 (Nawaf Renee)





- Data Points


Laboratory Results: 


 Laboratory Results





 06/25/18 06:50 





 06/25/18 06:50 





 











  06/25/18 06/25/18 06/25/18





  08:00 06:50 06:50


 


WBC      8.93 10^3/uL 10^3/uL





     (3.80-9.50) 


 


RBC      5.17 10^6/uL 10^6/uL





     (4.40-6.38) 


 


Hgb      13.6 g/dL L g/dL





     (13.7-17.5) 


 


Hct      42.2 % %





     (40.0-51.0) 


 


MCV      81.6 fL fL





     (81.5-99.8) 


 


MCH      26.3 pg L pg





     (27.9-34.1) 


 


MCHC      32.2 g/dL L g/dL





     (32.4-36.7) 


 


RDW      14.6 % %





     (11.5-15.2) 


 


Plt Count      263 10^3/uL 10^3/uL





     (150-400) 


 


MPV      10.6 fL fL





     (8.7-11.7) 


 


Neut % (Auto)      65.3 % %





     (39.3-74.2) 


 


Lymph % (Auto)      21.5 % %





     (15.0-45.0) 


 


Mono % (Auto)      8.7 % %





     (4.5-13.0) 


 


Eos % (Auto)      3.7 % %





     (0.6-7.6) 


 


Baso % (Auto)      0.4 % %





     (0.3-1.7) 


 


Nucleat RBC Rel Count      0.0 % %





     (0.0-0.2) 


 


Absolute Neuts (auto)      5.82 10^3/uL 10^3/uL





     (1.70-6.50) 


 


Absolute Lymphs (auto)      1.92 10^3/uL 10^3/uL





     (1.00-3.00) 


 


Absolute Monos (auto)      0.78 10^3/uL 10^3/uL





     (0.30-0.80) 


 


Absolute Eos (auto)      0.33 10^3/uL 10^3/uL





     (0.03-0.40) 


 


Absolute Basos (auto)      0.04 10^3/uL 10^3/uL





     (0.02-0.10) 


 


Absolute Nucleated RBC      0.00 10^3/uL 10^3/uL





     (0-0.01) 


 


Immature Gran %      0.4 % %





     (0.0-1.1) 


 


Immature Gran #      0.04 10^3/uL 10^3/uL





     (0.00-0.10) 


 


Sodium    146 mEq/L H mEq/L  





    (135-145)  


 


Potassium    4.2 mEq/L mEq/L  





    (3.3-5.0)  


 


Chloride    104 mEq/L mEq/L  





    ()  


 


Carbon Dioxide    27 mEq/l mEq/l  





    (22-31)  


 


Anion Gap    15 mEq/L mEq/L  





    (8-16)  


 


BUN    26 mg/dL H mg/dL  





    (7-23)  


 


Creatinine    1.1 mg/dL mg/dL  





    (0.7-1.3)  


 


Estimated GFR    > 60   





    


 


Glucose    111 mg/dL H mg/dL  





    ()  


 


Calcium    8.9 mg/dL mg/dL  





    (8.5-10.4)  


 


Urine Opiates Screen  NEGATIVE     





   (NEGATIVE)   


 


Urine Barbiturates  NEGATIVE     





   (NEGATIVE)   


 


Ur Phencyclidine Scrn  NEGATIVE     





   (NEGATIVE)   


 


Ur Amphetamine Screen  NEGATIVE     





   (NEGATIVE)   


 


U Benzodiazepines Scrn  NEGATIVE     





   (NEGATIVE)   


 


Urine Cocaine Screen  NEGATIVE     





   (NEGATIVE)   


 


U Marijuana (THC) Screen  NEGATIVE     





   (NEGATIVE)   


 


Ethyl Alcohol    < 10 mg/dL mg/dL  





    (0-10)  











Medications Given: 


 








Discontinued Medications





Lorazepam (Ativan)  1 mg PO ONCE ONE


   Stop: 06/25/18 06:44


   Last Admin: 06/25/18 07:02 Dose:  1 mg








Departure





- Departure


Disposition: Home, Routine, Self-Care


Clinical Impression: 


 PTSD (post-traumatic stress disorder)





Condition: Good


Instructions:  Post Traumatic Stress Disorder (ED)


Additional Instructions: 


1. Please follow-up with the mental health resources provided in the ED today.


2. [facility] does operate a 24/7 psychiatric crisis unit located 

at [facility]. The telephone number for the 24 hour crisis center is [phone].


3. Please return to the ED if you are feeling suicidal, having thoughts of 

harming yourself/others or should you feel unsafe or have worsening symptoms.











Referrals: 


[facility] [Clinic] - As per Instructions

## 2018-07-13 ENCOUNTER — HOSPITAL ENCOUNTER (EMERGENCY)
Dept: HOSPITAL 80 - FED | Age: 38
Discharge: HOME | End: 2018-07-13
Payer: MEDICAID

## 2018-07-13 VITALS — DIASTOLIC BLOOD PRESSURE: 85 MMHG | SYSTOLIC BLOOD PRESSURE: 117 MMHG

## 2018-07-13 VITALS — DIASTOLIC BLOOD PRESSURE: 69 MMHG | SYSTOLIC BLOOD PRESSURE: 107 MMHG

## 2018-07-13 DIAGNOSIS — G40.909: Primary | ICD-10-CM

## 2018-07-13 LAB — PLATELET # BLD: 214 10^3/UL (ref 150–400)

## 2018-07-13 NOTE — EDPHY
HPI/HX/ROS/PE/MDM


Narrative: 





CHIEF COMPLAINT: "Apparently I had a seizure"





HPI: The patient is a 37 y/o male with a known seizure disorder arriving via 

EMS for evaluation after a witnessed seizure this morning while at work. Per EMS

, his manager witnessed tonic-clonic seizure activity and the patient was A&Ox2 

upon their arrival. The patient disagrees with this report and says he was 

fully alert and attempted to decline transport. He denies any complaints, 

though does say "apparently I have a head abrasion. I feel fine." He is 

declining all treatments here and is only offering short, minimally cooperative 

answers during assessment. He reports his last seizure was in May and he has 

not missed any doses of his Keppra recently. 





REVIEW OF SYSTEMS:


Aside from elements discussed in the HPI, a comprehensive 10-point review of 

systems was reviewed and is negative.





PMH: Seizure disorder - Keppra





SOCIAL HISTORY: . Employed. Lives in [place]. 





PHYSICAL EXAM:


General:Patient is alert, in no acute distress.


Head: Abrasion left frontal scalp


ENT:Eyes are normal to inspection.  ENT inspection normal.


Neck: Normal inspection.  Full range of motion.


Respiratory:No respiratory distress.  Breath sounds normal bilaterally.


Cardiovascular: Regular rate and rhythm.  Strong peripheral pulses.  Normal cap 

refill.


Abdomen:The abdomen is nontender to palpation. There are no peritoneal signs.


Back: Normal to inspection.  No tenderness to palpation.


Skin: Normal color.  No rash.  Warm and dry.


Extremities: Normal appearance.  Full range of motion.


Neuro: Oriented x3.  Normal motor function.  Normal sensory function.


ED Course: 





This is a 37 y/o male with a history of a seizure disorder who presents for 

evaluation following a witnessed seizure this morning. On exam he has a minor 

left frontal scalp abrasion and is neurovascularly intact. He is refusing any 

medication including a dose of his Keppra or intervention here and is only 

staying in the ED to wait for his wife to pick him up. 





Patient is standing in the doorway fully-dressed and requesting to leave the 

ED. He has been provided standard seizure disorder care and follow up 

instructions as well as precautions. 





General


Time Seen by Provider: 07/13/18 07:58


Initial Vital Signs: 


 Initial Vital Signs











Temperature (C)  36.9 C   07/13/18 07:52


 


Heart Rate  97   07/13/18 07:52


 


Respiratory Rate  18   07/13/18 07:52


 


Blood Pressure  114/84 H  07/13/18 07:52


 


O2 Sat (%)  96   07/13/18 07:52








 











O2 Delivery Mode               Room Air














Allergies/Adverse Reactions: 


 





No Known Allergies Allergy (Verified 03/12/18 14:38)


 








Home Medications: 














 Medication  Instructions  Recorded


 


Prozac 20 MG (*)  03/10/18


 


Seroquel  03/10/18


 


Keppra  05/02/18


 


Meloxicam  05/02/18














Departure





- Departure


Disposition: Home, Routine, Self-Care


Clinical Impression: 


 Seizure disorder





Condition: Good


Instructions:  Recurrent Seizures in Adults (ED)


Additional Instructions: 


Take all medications as directed. Follow up with your neurologist for 

evaluation of your breakthrough seizures. Do not drive or perform other 

activities that could put you or others at risk before clearance from 

neurologist to do so. Return to the ED for severe headache, weakness, numbness, 

confusion, speech difficulty, or other worsening of condition. 


Referrals: 


Jn Carlin MD [Medical Doctor] - As per Instructions


Report Scribed for: Jc Preston


Report Scribed by: Kae Fenton


Date of Report: 07/13/18


Time of Report: 08:03


Physician Review and Approval Statement: Portions of this note were transcribed 

by an ED scribe.  I personally performed the history, physical exam, and 

medical decision making; and confirm the accuracy of the information in the 

transcribed note.

## 2018-07-13 NOTE — EDPHY
HPI/HX/ROS/PE/MDM


Narrative: 





CHIEF COMPLAINT: Seizure, combative on scene





HPI: The patient is a 37 y/o male with a known seizure disorder arriving via 

EMS for the second time this morning after another witnessed seizure. During 

his prior visit this morning he refused all treatments including a dose of 

Keppra and was discharged at his insistence. Later this morning while at a 

clinic shortly after getting a Toradol injection for chronic neck pain he had 

another witnessed tonic-clonic seizure per EMS. He was apparently combative on 

scene and required 4-pt restraints for transport here. His wife apparently told 

EMS that she does not want him to be released from the ED to their home until 

he has been treated for his seizures. Upon arrival, he is adamantly refusing 

all vitals and treatments, but is willing to take a dose of Keppra. He denies 

any complaints. 





REVIEW OF SYSTEMS:


Aside from elements discussed in the HPI, a comprehensive 10-point review of 

systems was reviewed and is negative.





PMH: Seizure disorder - Keppra





SOCIAL HISTORY: . Employed. Lives in [place]. 





PHYSICAL EXAM:


General:Patient is alert, in no acute distress.


Head: Scalp abrasion


ENT:Eyes are normal to inspection.  ENT inspection normal.


Neck: Normal inspection.  Full range of motion.


Respiratory:No respiratory distress.  Breath sounds normal bilaterally.


Cardiovascular: Regular rate and rhythm.  Strong peripheral pulses.  Normal cap 

refill.


Abdomen:The abdomen is nontender to palpation. There are no peritoneal signs.


Back: Normal to inspection.  No tenderness to palpation.


Skin: Normal color.  No rash.  Warm and dry.


Extremities: Normal appearance.  Full range of motion.


Neuro: Oriented x3.  Normal motor function.  Normal sensory function.


ED Course: 


This is a 37 y/o male with a known seizure disorder who presents for the 2nd 

time this morning after a witnessed seizure. Per EMS he was combative on scene 

and required restraints for transport. During his earlier visit today he 

refused any interventions or treatments and upon arrival is again refusing any 

treatments other than taking a dose of Keppra. He has a scalp abrasion on exam 

and no focal neuro deficits. The  will attempt to get more 

information from the patient's wife. The patient has been offered 1000mg PO 

Tylenol, 500mg PO Keppra, and 1mg PO Ativan.





I spoke with the patient's wife. She will talk with the patient and encourage 

him to stay here for evaluation. 





Patient has agreed to medication, IV, labs, and head and neck CT imaging. 





Head and neck CTs: negative for acute process. 





Reassessed patient and discussed findings. He is feeling improved and is 

cooperative and polite with staff. He feels ready for discharge home and will 

be given standard seizure care and follow up instructions. Standard precautions 

given. He is comfortable with this plan. 





- Data Points


Imaging Results: 


 Imaging Impressions





Cervical Spine CT  07/13/18 12:09


Impression: 


1. No acute posttraumatic abnormality identified. If there is persistent pain 

or neurologic deficit, consider MRI and/or flexion and extension views if 

clinically indicated. 


2. Polypoid structure along the right margin of the trachea measuring up to 9 

mm which could be related to polyp or artifact from secretions. Direct 

visualization is recommended.


3. Congenital spinal canal narrowing exacerbated by degenerative change, 

causing moderate spinal narrowing at C4-C5 and C5-C6. 


4. Additional findings as above.


 


Findings discussed with Dr. Jc Preston July 13, 2018 at 1256 hours. 








Head CT  07/13/18 12:09


Impression: 


1. No acute intracranial findings. If symptoms persist and clinical suspicion 

warrants, consider MRI.


2. Stable indeterminate likely benign sclerotic osseous foci. If the patient 

has a history of malignancy, consider bone scan.


 


Findings discussed with Jc Preston MD 7/13/2018 at 12:56. 


 


 











Imaging: Discussed imaging studies w/ On call Radiologist, I viewed and 

interpreted images myself


Laboratory Results: 


 Laboratory Results





 07/13/18 12:15 





 07/13/18 12:15 





 











  07/13/18 07/13/18





  12:15 12:15


 


WBC    10.80 10^3/uL H 10^3/uL





    (3.80-9.50) 


 


RBC    4.93 10^6/uL 10^6/uL





    (4.40-6.38) 


 


Hgb    13.0 g/dL L g/dL





    (13.7-17.5) 


 


Hct    39.6 % L %





    (40.0-51.0) 


 


MCV    80.3 fL L fL





    (81.5-99.8) 


 


MCH    26.4 pg L pg





    (27.9-34.1) 


 


MCHC    32.8 g/dL g/dL





    (32.4-36.7) 


 


RDW    14.6 % %





    (11.5-15.2) 


 


Plt Count    214 10^3/uL 10^3/uL





    (150-400) 


 


MPV    10.6 fL fL





    (8.7-11.7) 


 


Neut % (Auto)    75.7 % H %





    (39.3-74.2) 


 


Lymph % (Auto)    12.6 % L %





    (15.0-45.0) 


 


Mono % (Auto)    8.6 % %





    (4.5-13.0) 


 


Eos % (Auto)    0.8 % %





    (0.6-7.6) 


 


Baso % (Auto)    0.4 % %





    (0.3-1.7) 


 


Nucleat RBC Rel Count    0.0 % %





    (0.0-0.2) 


 


Absolute Neuts (auto)    8.17 10^3/uL H 10^3/uL





    (1.70-6.50) 


 


Absolute Lymphs (auto)    1.36 10^3/uL 10^3/uL





    (1.00-3.00) 


 


Absolute Monos (auto)    0.93 10^3/uL H 10^3/uL





    (0.30-0.80) 


 


Absolute Eos (auto)    0.09 10^3/uL 10^3/uL





    (0.03-0.40) 


 


Absolute Basos (auto)    0.04 10^3/uL 10^3/uL





    (0.02-0.10) 


 


Absolute Nucleated RBC    0.00 10^3/uL 10^3/uL





    (0-0.01) 


 


Immature Gran %    1.9 % H %





    (0.0-1.1) 


 


Immature Gran #    0.21 10^3/uL H 10^3/uL





    (0.00-0.10) 


 


Sodium  137 mEq/L mEq/L  





   (135-145)  


 


Potassium  3.7 mEq/L mEq/L  





   (3.3-5.0)  


 


Chloride  107 mEq/L mEq/L  





   ()  


 


Carbon Dioxide  21 mEq/l L mEq/l  





   (22-31)  


 


Anion Gap  9 mEq/L mEq/L  





   (8-16)  


 


BUN  25 mg/dL H mg/dL  





   (7-23)  


 


Creatinine  1.2 mg/dL mg/dL  





   (0.7-1.3)  


 


Estimated GFR  > 60   





   


 


Glucose  109 mg/dL H mg/dL  





   ()  


 


Calcium  8.7 mg/dL mg/dL  





   (8.5-10.4)  











Medications Given: 


 








Discontinued Medications





Acetaminophen (Tylenol)  1,000 mg PO EDNOW ONE


   Stop: 07/13/18 11:49


   Last Admin: 07/13/18 11:49 Dose:  1,000 mg


Levetiracetam (Keppra)  500 mg PO EDNOW ONE


   Stop: 07/13/18 11:46


   Last Admin: 07/13/18 11:46 Dose:  500 mg


Lorazepam (Ativan)  1 mg PO EDNOW ONE


   Stop: 07/13/18 11:42


   Last Admin: 07/13/18 11:46 Dose:  1 mg








General


Time Seen by Provider: 07/13/18 11:38


Initial Vital Signs: 


 Initial Vital Signs











Temperature (C)  37.0 C   07/13/18 12:18


 


Heart Rate  94   07/13/18 12:18


 


Respiratory Rate  16   07/13/18 12:18


 


Blood Pressure  107/69   07/13/18 12:18


 


O2 Sat (%)  95   07/13/18 12:18








 











O2 Delivery Mode               Room Air














Allergies/Adverse Reactions: 


 





No Known Allergies Allergy (Verified 03/12/18 14:38)


 








Home Medications: 














 Medication  Instructions  Recorded


 


Prozac 20 MG (*)  03/10/18


 


Seroquel  03/10/18


 


Keppra  05/02/18


 


Meloxicam  05/02/18














Departure





- Departure


Disposition: Home, Routine, Self-Care


Clinical Impression: 


 Seizure disorder, Recurrent seizures





Instructions:  Recurrent Seizures in Adults (ED)


Additional Instructions: 


1. Take Keppra as directed. Be careful to not miss doses. Set reminders on your 

phone if helpful.


2. Follow up with neurologist in the next 2-3 days for evaluation of your 

breakthrough seizures. 


3. Do not drive or do any other activity that could put you or others at risk 

in event of a recurrent seizure until cleared to do so by neurologist.


4. Return for any worsening of condition. 





There was an incidental finding on your CT of a polyp near your trachea that 

requires follow up with an ENT in the next month. 


Referrals: 


Jn Carlin MD [Medical Doctor] - As per Instructions


Chris Corona MD [Medical Doctor] - As per Instructions


Report Scribed for: Jc Preston


Report Scribed by: Kae Fenton


Date of Report: 07/13/18


Time of Report: 11:40


Physician Review and Approval Statement: Portions of this note were transcribed 

by an ED scribe.  I personally performed the history, physical exam, and 

medical decision making; and confirm the accuracy of the information in the 

transcribed note.

## 2018-10-29 ENCOUNTER — HOSPITAL ENCOUNTER (OUTPATIENT)
Dept: HOSPITAL 80 - FSGY | Age: 38
Setting detail: OBSERVATION
LOS: 1 days | Discharge: HOME | End: 2018-10-30
Attending: OTOLARYNGOLOGY | Admitting: OTOLARYNGOLOGY
Payer: MEDICAID

## 2018-10-29 DIAGNOSIS — Z23: ICD-10-CM

## 2018-10-29 DIAGNOSIS — J31.0: ICD-10-CM

## 2018-10-29 DIAGNOSIS — G40.909: ICD-10-CM

## 2018-10-29 DIAGNOSIS — J34.89: Primary | ICD-10-CM

## 2018-10-29 DIAGNOSIS — F17.200: ICD-10-CM

## 2018-10-29 DIAGNOSIS — J34.3: ICD-10-CM

## 2018-10-29 DIAGNOSIS — F32.9: ICD-10-CM

## 2018-10-29 DIAGNOSIS — J34.2: ICD-10-CM

## 2018-10-29 DIAGNOSIS — F43.10: ICD-10-CM

## 2018-10-29 PROCEDURE — 30520 REPAIR OF NASAL SEPTUM: CPT

## 2018-10-29 PROCEDURE — 30140 RESECT INFERIOR TURBINATE: CPT

## 2018-10-29 PROCEDURE — G0008 ADMIN INFLUENZA VIRUS VAC: HCPCS

## 2018-10-29 PROCEDURE — G0009 ADMIN PNEUMOCOCCAL VACCINE: HCPCS

## 2018-10-29 PROCEDURE — 90471 IMMUNIZATION ADMIN: CPT

## 2018-10-29 PROCEDURE — G0378 HOSPITAL OBSERVATION PER HR: HCPCS

## 2018-10-29 RX ADMIN — CALCIUM SCH MG: 500 TABLET ORAL at 22:25

## 2018-10-29 RX ADMIN — OXYCODONE HYDROCHLORIDE AND ACETAMINOPHEN PRN TAB: 5; 325 TABLET ORAL at 22:26

## 2018-10-29 RX ADMIN — CEPHALEXIN SCH MG: 500 CAPSULE ORAL at 18:26

## 2018-10-29 RX ADMIN — CEPHALEXIN SCH MG: 500 CAPSULE ORAL at 22:25

## 2018-10-29 RX ADMIN — OXYCODONE HYDROCHLORIDE AND ACETAMINOPHEN PRN TAB: 5; 325 TABLET ORAL at 18:06

## 2018-10-29 NOTE — PDANEPAE
ANE History of Present Illness





37 y/o male, dailly smoker, seizure disorder, depression, ptsd, here for 

bilateral septoplasty.








ANE Past Medical History





- Cardiovascular History


Hx Hypertension: No


Hx Arrhythmias: No


Hx Chest Pain: No


Hx Coronary Artery / Peripheral Vascular Disease: No


Hx CHF / Valvular Disease: No


Hx Palpitations: No





- Pulmonary History


Hx COPD: No


Hx Asthma/Reactive Airway Disease: No


Hx Recent Upper Respiratory Infection: No


Hx Oxygen in Use at Home: No


Hx Sleep Apnea: No


Sleep Apnea Screening Result - Last Documented: Negative


Pulmonary History Comment: hx spontaeous collapsed lung, in early 20's





- Neurologic History


Hx Cerebrovascular Accident: No


Hx Seizures: Yes


Hx Dementia: No


Neurologic History Comment: epilepsy - last seizure in August 18, 2018





- Endocrine History


Hx Diabetes: No





- Renal History


Hx Renal Disorders: No





- Liver History


Hx Hepatic Disorders: No





- Neurological & Psychiatric Hx


Hx Neurological and Psychiatric Disorders: Yes


Neurological / Psychiatric History Comment: depression, PTSD





- Cancer History


Hx Cancer: No





- Congenital Disorder History


Hx Congenital Disorders: No





- GI History


Hx Gastrointestinal Disorders: Yes


Gastrointestinal History Comment: occasional acid reflux





- Other Health History


Other Health History: deviated septum.  fractured thoracic vertebrae





- Chronic Pain History


Chronic Pain: Yes (back pain)





- Surgical History


Prior Surgeries: none





ANE Review of Systems


Review of Systems: 








- Exercise capacity


Exercise capacity: >=4 METS


METS (RN): 4 METS





ANE Patient History





- Allergies


Allergies/Adverse Reactions: 








No Known Allergies Allergy (Verified 03/12/18 14:38)


 








- Home Medications


Home medications: home medication list seen and reviewed


Home Medications: 








Prozac 20 MG (*) DAILY 03/10/18 [Last Taken 10/29/18 08:00]


Seroquel HS 03/10/18 [Last Taken 10/28/18]


Keppra BID 05/02/18 [Last Taken 10/29/18 08:00]


Meloxicam DAILY 05/02/18 [Last Taken 10/29/18 08:00]


Calcium DAILY 10/18/18 [Last Taken 10/28/18]


Excedrin Tablet (*) PRN 10/18/18 [Last Taken 10/28/18]


Tizanidine HCl TID 10/29/18 [Last Taken 10/28/18]


Zonisamide [Zonegran 100MG (*)] 400 mg HS 10/29/18 [Last Taken 10/28/18]








- NPO status


NPO Status: no food or drink >8 hours


NPO Since - Liquids (Date): 10/29/18


NPO Since - Liquids (Time): 09:30


NPO Since - Solids (Date): 10/29/18


NPO Since - Solids (Time): 00:00





- Smoking Hx


Smoking Status: Current every day smoker





- Family Anes Hx


Family Hx Anesthesia Complications: none





ANE Labs/Vital Signs





- Vital Signs


Blood Pressure: 109/65


Heart Rate: 68


Respiratory Rate: 16


O2 Sat (%): 97


Height: 185.42 cm


Weight: 86.183 kg





ANE Physical Exam





- Airway


Mallampati Score: Class 2


Mouth exam: normal dental/mouth exam





- Pulmonary


Pulmonary: no respiratory distress, clear to auscultation





- Cardiovascular


Cardiovascular: regular rate and rhythym





- ASA Status


ASA Status: III

## 2018-10-29 NOTE — PDHPUP
History & Physical Update


H&P update statement: 


This history and physical update is based on an assessment of the patient which 

was completed after admission or registration (within 24 hours), but prior to 

the surgery/procedure.





H&P update: H&P reviewed & patient examined, no change in patient's condition 

since H&P completed (pt without changes, off afrin)

## 2018-10-29 NOTE — POSTOPPROG
Post Op Note


Date of Operation: 10/29/18


Surgeon: Marilin Talavera


Anesthesia: GET(General Endotracheal)


Pre-op Diagnosis: nasal obstruction


Post-op Diagnosis: same


Procedure: septoplasty, SMR turbs


Findings: DNS L, ITH B


Inf/Abcess present in the surg proc area at time of surgery?: No


EBL: Minimal


Complications: 





none apparent

## 2018-10-30 VITALS — DIASTOLIC BLOOD PRESSURE: 73 MMHG | SYSTOLIC BLOOD PRESSURE: 110 MMHG

## 2018-10-30 RX ADMIN — CALCIUM SCH MG: 500 TABLET ORAL at 09:50

## 2018-10-30 RX ADMIN — CEPHALEXIN SCH MG: 500 CAPSULE ORAL at 13:33

## 2018-10-30 RX ADMIN — CEPHALEXIN SCH MG: 500 CAPSULE ORAL at 09:51

## 2018-10-30 RX ADMIN — OXYCODONE HYDROCHLORIDE AND ACETAMINOPHEN PRN TAB: 5; 325 TABLET ORAL at 03:19

## 2018-10-30 NOTE — SOAPPROG
SOAP Progress Note


Assessment/Plan: 


Assessment:





D/c home today. Rx as prescribed.  Call with any questions




















Plan:





10/30/18 13:10





Subjective: 





c/o congestion and d/c.   Had difficulty voiding last night, I&O cath x 2. Has 

urinated on own this am without difficulty


Objective: 


AFVSS RA


congested


no active bleeding


splints in place


 Vital Signs











Temp Pulse Resp BP Pulse Ox


 


 36.2 C   71   14   110/73   92 


 


 10/30/18 11:05  10/30/18 11:05  10/30/18 11:05  10/30/18 11:05  10/30/18 11:05








 











 10/29/18 10/30/18 10/31/18





 05:59 05:59 05:59


 


Intake Total  1695 500


 


Output Total  910 1050


 


Balance  785 -550














ICD10 Worksheet


Patient Problems: 


 Problems











Problem Status Onset


 


Nasal obstruction Acute  














- ICD10 Problem Qualifiers


(1) Nasal obstruction

## 2018-10-30 NOTE — ASMTCMCOM
CM Note

 

CM Note                       

Notes:

Pt is a 37 y/o man admitted for a deviated nasal septum. Pt had surgery w/ ENT. Pt is being 

discharged today. Pt reports that he does not have a a ride home or any money. Pt reports that his 

wife cannot pick him up because she has a DVT. CM provided pt w/ a taxi voucher by the request of 

Dr. Talavera. No other needs at this time. CM available for changes.







Plan: Independent 

 

Date Signed:  10/30/2018 12:35 PM

Electronically Signed By:WADE Jett
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
## 2018-10-31 ENCOUNTER — HOSPITAL ENCOUNTER (EMERGENCY)
Dept: HOSPITAL 80 - FED | Age: 38
Discharge: HOME | End: 2018-10-31
Payer: MEDICAID

## 2018-10-31 VITALS — DIASTOLIC BLOOD PRESSURE: 68 MMHG | SYSTOLIC BLOOD PRESSURE: 142 MMHG

## 2018-10-31 DIAGNOSIS — F32.9: ICD-10-CM

## 2018-10-31 DIAGNOSIS — F43.10: ICD-10-CM

## 2018-10-31 DIAGNOSIS — E86.9: ICD-10-CM

## 2018-10-31 DIAGNOSIS — K22.9: ICD-10-CM

## 2018-10-31 DIAGNOSIS — N13.2: Primary | ICD-10-CM

## 2018-10-31 DIAGNOSIS — F17.210: ICD-10-CM

## 2018-10-31 DIAGNOSIS — G40.909: ICD-10-CM

## 2018-10-31 LAB — PLATELET # BLD: 212 10^3/UL (ref 150–400)

## 2018-10-31 NOTE — POSTANESTH
Post Anesthetic Evaluation


Respiratory Status: Normal, Stable


Level of Consciousness/Mental Status: Can Participate in Eval


Pain Control: Adequate, Prn Tx Ordered


Nausea/Vomiting Control: Adequate, Prn Tx Ordered


Complications Possibly Related to Anesthesia: None Noted (This  is a late entry 

for 10/29/18. I was involved with the patient's recovery and assessed him 

appropriately on that date.)

## 2018-10-31 NOTE — GOP
DATE OF OPERATION:  10/29/2018



SURGEON:  Marilin Talavera MD



ANESTHESIA:  General.



PREOPERATIVE DIAGNOSIS:  

1.  Nasal obstruction.

2.  Deviated nasal septum.

3.  Inferior turbinate hypertrophy bilaterally.



POSTOPERATIVE DIAGNOSIS:  

1.  Nasal obstruction.

2.  Deviated nasal septum.

3.  Inferior turbinate hypertrophy bilaterally.



PROCEDURE PERFORMED:  

1.  Endoscopically assisted septoplasty.

2.  Submucous resection of inferior turbinates bilaterally.



FINDINGS:  Patient was found to have a significant deviation to the left with a 
large septal spur on this side.  He is also noted to have enlarged turbinates 
bilaterally.





ESTIMATED BLOOD LOSS:  Minimal.



COMPLICATIONS:  None.



PREOPERATIVE NOTE:  Kalyan is a pleasant 38-year-old man who has a history of 
nasal obstruction, worse on the left than the right.  He has tried nasal sprays 
in the past without significant benefit.  He was found to have the above-noted 
anatomical abnormalities, and felt that he would benefit from the above 
procedure.



DESCRIPTION OF PROCEDURE:  The patient was first seen in the preoperative area 
where informed consent was obtained.  He was then brought back to the operating 
room where Anesthesia sedated and intubated him.  The bed was turned 90 degrees
, and he was prepped and draped in a normal fashion.  Universal time-out 
protocol was performed.  Once I had confirmed the patient and the procedure, 2% 
lidocaine with 1:100,000 epinephrine was injected into the septum on both sides
, as well as the head of the inferior turbinates on both sides.  



Once this had sufficient time to act, a left-sided hemitransfixion incision was 
made down to the cartilage and then a mucoperichondrial flap was elevated on 
the left, back to the bony cartilaginous junction.  This junction was 
dislocated inferiorly to superiorly, but taking care to stay at least a 
centimeter below the dorsal aspect of the cartilage to leave a strut.  At this 
point, a D-knife was used to make an incision from posterior to anterior and 
superior to inferior, creating a square piece of cartilage.  The caudal was 
used to elevate the mucoperichondrial flap off the opposite side, and once the 
cartilage was completely isolated and released, this was removed.  Then a 
combination of hand instruments were used to remove the spur as well.  The 0-
degree scope throughout the procedure was placed through the hemitransfixion 
incision to help with access to the more posterior aspect of the septum, and 
for better visualization of certain points.  Once the offending cartilage and 
bone was removed, all instruments were removed and he was noted on the scope 
exam to have significant more patency on both sides.  So, at this point, a 5-0 
plain gut on a Freddy needle was used to place quilting sutures through-and-
through the septum, and then a 4-0 chromic was used to place interrupted 
sutures to the hemitransfixion incision. 



At this point, we turned our attention to the inferior turbinates.  A 2 mm 
debrider blade was used to make a small stab incision in the head of the 
inferior turbinate on the left.  This was then used under direct visualization 
using the 0-degree scope to perform a submucous resection along the length of 
the turbinate, and then the instrument was removed.  I did the exact same thing 
on the right side, then removed the blade.  I then used the 0-degree scope to 
evaluate the posterior aspect of the turbinates.  He did have some bulbosity in 
this area, and so a suction cautery was used under direct visualization to just 
gently cauterize the very back part of the turbinate on both sides, thereby 
shrinking this tissue down and decreasing the obstruction more posteriorly.  
Once this was done, all the instruments were removed.  His nose was irrigated 
out copiously with normal saline and suctioned clear.  A Charlton splint that had 
been cut to size was placed on either side of the septum, and then a 3-0 
Prolene was used to suture these to the caudal portion of the septum with a 
through-and-through stitch.  



All instrument and pledget counts were correct at the end the procedure.  The 
patient was turned back over to Anesthesia, where he was awoken and extubated 
and taken to the PACU in stable condition.  There were no complications, and he 
tolerated the procedure well.







Job #:  [id number]

MTDD

## 2018-10-31 NOTE — EDPHY
H & P


Time Seen by Provider: 10/31/18 17:51


HPI/ROS: 





CHIEF COMPLAINT:  Right lower quadrant abdominal pain





HISTORY OF PRESENT ILLNESS:  The patient is a 38-year-old man with history of 

seizure disorder, depression, PTSD who comes to the emergency department via 

EMS complaining of right lower abdominal "tenderness".  He states that it is 

not painful but is tender.  He has felt nauseous but has not vomited.  No 

diarrhea.  No fever.  No urinary symptoms.  No back pain.  He states that the 

symptoms began this morning and have not resolved.  No history of abdominal 

surgery.  He states that he did have nasal septum surgery yesterday.


Severity:  Moderate


Modifying factors:  None





REVIEW OF SYSTEMS:


Constitutional:  denies: chills, fever, recent illness, recent injury


EENTM: denies: blurred vision, double vision, nose congestion


Respiratory: denies: cough, shortness of breath


Cardiac: denies: chest pain, irregular heart rate, lightheadedness, palpitations


Gastrointestinal/Abdominal:  See HPI denies:  diarrhea, vomiting, blood 

streaked stools


Genitourinary: denies: dysuria, frequency, hematuria, pain


Musculoskeletal: denies: joint pain, muscle pain


Skin: denies: lesions, rash, jaundice, bruising


Neurological: denies: headache, numbness, paresthesia, tingling, dizziness, 

weakness


Hematologic/Lymphatic: denies: blood clots, easy bleeding, easy bruising


Immunologic/allergic: denies: HIV/AIDS, transplant


 10 systems reviewed and negative except as noted





EXAM:


GENERAL:  Nauseous, no acute distress.


HEAD:  Atraumatic, normocephalic.


EYES:  Pupils equal round and reactive to light, extraocular movements intact, 

sclera anicteric, conjunctiva are normal.


ENT:  TMs normal, nares patent, oropharynx clear without exudates.  Moist 

mucous membranes.


NECK:  Normal range of motion, supple without lymphadenopathy or JVD.


LUNGS:  Breath sounds clear to auscultation bilaterally and equal.  No wheezes 

rales or rhonchi.


HEART:  Regular rate and rhythm without murmurs, rubs or gallops.


ABDOMEN:  Slightly distended, nontender, normoactive bowel sounds.  No guarding

, no rebound.  No masses appreciated. 


BACK:  No CVA tenderness, no spinal tenderness, step-offs or deformities


EXTREMITIES:  Normal range of motion, no pitting or edema.  No clubbing or 

cyanosis.


NEUROLOGICAL:  Cranial nerves II through XII grossly intact.  Normal speech, 

normal gait.  5/5 strength, normal movement in all extremities, normal sensation

, normal reflexes


PSYCH:  Normal mood, normal affect.


SKIN:  Warm, dry, normal turgor, no visible rashes or lesions.








Source: Patient, EMS


Exam Limitations: No limitations





- Personal History


Tetanus Vaccine Date: < 10 years





- Medical/Surgical History


Hx Asthma: No


Hx Chronic Respiratory Disease: No


Hx Diabetes: No


Hx Cardiac Disease: No


Hx Renal Disease: No


Hx Cirrhosis: No


Hx Alcoholism: Yes


Hx HIV/AIDS: No


Hx Splenectomy or Spleen Trauma: No


Other PMH: Left spontaneous pneumothorax,.  depression, PTSD, seizures, FX SPINE

, osteoporosis





- Family History


Significant Family History: No pertinent family hx





- Social History


Smoking Status: Current every day smoker


Alcohol Use: Sober


Drug Use: None


Constitutional: 


 Initial Vital Signs











Temperature (C)  36.7 C   10/31/18 17:49


 


Heart Rate  70   10/31/18 17:49


 


Respiratory Rate  18   10/31/18 17:49


 


Blood Pressure  141/93 H  10/31/18 17:49


 


O2 Sat (%)  97   10/31/18 17:49








 











O2 Delivery Mode               Room Air














Allergies/Adverse Reactions: 


 





No Known Allergies Allergy (Verified 10/31/18 17:52)


 








Home Medications: 














 Medication  Instructions  Recorded


 


QUEtiapine FUMARATE [Seroquel 600 mg PO HS 03/10/18





300mg (*)]  


 


levETIRAcetam [Keppra 500 mg (*)] 500 mg PO BID 05/02/18


 


Acetaminophen/ASA/Caffeine 1 tab PO BID PRN 10/18/18





[Excedrin Tablet (*)]  


 


Calcium Carbonate [Oyster Shell 1,000 mg PO BID 10/18/18





Calcium 500 mg (*)]  


 


FLUoxetine HCL [Fluoxetine HCl] 80 mg PO DAILY 10/29/18


 


Tizanidine HCl 4 mg PO TID 10/29/18


 


Zonisamide [Zonegran 100MG (*)] 400 mg PO HS 10/29/18


 


Cephalexin [Keflex (*)] 500 mg PO TID #21 cap 10/30/18


 


Sodium Cl Nasal [Ocean Spray (*)] 3 spray NS Q2H PRN #1 btl 10/30/18


 


oxyCODONE/APAP 5/325 [Percocet 1 tab PO Q6H PRN #20 tab 10/30/18





5/325 (*)]  


 


Ondansetron Odt [Zofran Odt 4 mg 4 mg PO Q4 PRN #20 tab 10/31/18





(RX)]  


 


Tamsulosin HCl [Flomax] 0.4 mg PO DAILY #10 cap 10/31/18


 


morphINE IR [morphINE IR 15 mg (*)] 15 mg PO Q3-4PRN PRN #10 tab 10/31/18














Medical Decision Making





- Diagnostics


Imaging Results: 


 Imaging Impressions





Abdomen CT  10/31/18 17:55


Impression:  


1. 3 mm distal right ureteral calculus with associated with moderate 

hydronephrosis and delayed nephrogram. Bilateral nephrolithiasis. 


2. Dilatation of the distal esophagus, achalasia versus hiatal hernia.


 


 











Imaging: Discussed imaging studies w/ On call Radiologist


ED Course/Re-evaluation: 





Patient's creatinine is slightly elevated.  We will continue to hydrate with a 

2nd L of fluids and will proceed with CT scan.





7:50 p.m. we discussed the patient's CT scan.  He has not received pain 

medication.  I will start him on lidocaine however this will take some time to 

get from pharmacy.  I will give him Dilaudid initially.  I will not give him 

Toradol because of his slightly elevated creatinine.  He did receive contrast 

but recent studies have shown that this does not cause significant kidney 

injury.  He has received fluids.  He is continuing to drink.  I will also give 

him Flomax.  We discussed catching his stone and taking into the urologist for 

analysis and that he has several other stones in his kidney.





8:30 p.m. the patient's pain is controlled.  He is eager to go home.  He 

understands follow-up instructions and straining his urine.  We discussed 

indications for returning.


Differential Diagnosis: 





Partial list of the Differential diagnosis considered include but were not 

limited to;  kidney stones, appendicitis and although unlikely based on the 

history and physical exam, I also considered obstruction, diverticulitis, 

urinary tract infection.  I discussed these differential diagnoses and the plan 

with the patient as well as the usual and expected course.  The patient 

understands that the diagnosis is provisional and that in medicine we are not 

always correct and that further workup is often warranted.  Usual and customary 

warnings were given.  All of the patient's questions were answered.  The 

patient was instructed to return to the emergency department should the 

symptoms at all worsen or return, otherwise to followup with the physician as 

we discussed.





- Data Points


Laboratory Results: 


 Laboratory Results





 10/31/18 18:15 





 10/31/18 18:15 





 











  10/31/18 10/31/18 10/31/18





  20:14 18:15 18:15


 


WBC      10.91 10^3/uL H 10^3/uL





     (3.80-9.50) 


 


RBC      4.44 10^6/uL 10^6/uL





     (4.40-6.38) 


 


Hgb      12.2 g/dL L g/dL





     (13.7-17.5) 


 


Hct      37.2 % L %





     (40.0-51.0) 


 


MCV      83.8 fL fL





     (81.5-99.8) 


 


MCH      27.5 pg L pg





     (27.9-34.1) 


 


MCHC      32.8 g/dL g/dL





     (32.4-36.7) 


 


RDW      14.7 % %





     (11.5-15.2) 


 


Plt Count      212 10^3/uL 10^3/uL





     (150-400) 


 


MPV      10.7 fL fL





     (8.7-11.7) 


 


Neut % (Auto)      76.3 % H %





     (39.3-74.2) 


 


Lymph % (Auto)      8.7 % L %





     (15.0-45.0) 


 


Mono % (Auto)      10.5 % %





     (4.5-13.0) 


 


Eos % (Auto)      3.3 % %





     (0.6-7.6) 


 


Baso % (Auto)      0.4 % %





     (0.3-1.7) 


 


Nucleat RBC Rel Count      0.0 % %





     (0.0-0.2) 


 


Absolute Neuts (auto)      8.32 10^3/uL H 10^3/uL





     (1.70-6.50) 


 


Absolute Lymphs (auto)      0.95 10^3/uL L 10^3/uL





     (1.00-3.00) 


 


Absolute Monos (auto)      1.15 10^3/uL H 10^3/uL





     (0.30-0.80) 


 


Absolute Eos (auto)      0.36 10^3/uL 10^3/uL





     (0.03-0.40) 


 


Absolute Basos (auto)      0.04 10^3/uL 10^3/uL





     (0.02-0.10) 


 


Absolute Nucleated RBC      0.00 10^3/uL 10^3/uL





     (0-0.01) 


 


Immature Gran %      0.8 % %





     (0.0-1.1) 


 


Immature Gran #      0.09 10^3/uL 10^3/uL





     (0.00-0.10) 


 


Sodium    139 mEq/L mEq/L  





    (135-145)  


 


Potassium    4.1 mEq/L mEq/L  





    (3.3-5.0)  


 


Chloride    107 mEq/L mEq/L  





    ()  


 


Carbon Dioxide    22 mEq/l mEq/l  





    (22-31)  


 


Anion Gap    10 mEq/L mEq/L  





    (6-14)  


 


BUN    24 mg/dL H mg/dL  





    (7-23)  


 


Creatinine    1.7 mg/dL H mg/dL  





    (0.7-1.3)  


 


Estimated GFR    45   





    


 


Glucose    150 mg/dL H mg/dL  





    ()  


 


Calcium    9.1 mg/dL mg/dL  





    (8.5-10.4)  


 


Total Bilirubin    0.2 mg/dL mg/dL  





    (0.1-1.4)  


 


Conjugated Bilirubin    0.2 mg/dL mg/dL  





    (0.0-0.5)  


 


Unconjugated Bilirubin    0.0 mg/dL mg/dL  





    (0.0-1.1)  


 


AST    21 IU/L IU/L  





    (17-59)  


 


ALT    29 IU/L IU/L  





    (21-72)  


 


Alkaline Phosphatase    91 IU/L IU/L  





    ()  


 


Total Protein    6.7 g/dL g/dL  





    (6.3-8.2)  


 


Albumin    3.9 g/dL g/dL  





    (3.5-5.0)  


 


Lipase    49 IU/L IU/L  





    ()  


 


Urine Color  PALE YELLOW     





    


 


Urine Appearance  CLEAR     





    


 


Urine pH  7.0     





   (5.0-7.5)   


 


Ur Specific Gravity  1.016     





   (1.002-1.030)   


 


Urine Protein  NEGATIVE     





   (NEGATIVE)   


 


Urine Ketones  NEGATIVE     





   (NEGATIVE)   


 


Urine Blood  2+  H     





   (NEGATIVE)   


 


Urine Nitrate  NEGATIVE     





   (NEGATIVE)   


 


Urine Bilirubin  NEGATIVE     





   (NEGATIVE)   


 


Urine Urobilinogen  NEGATIVE EU EU    





   (0.2-1.0)   


 


Ur Leukocyte Esterase  NEGATIVE     





   (NEGATIVE)   


 


Urine RBC   /hpf H /hpf    





   (0-3)   


 


Urine WBC  3-5 /hpf H /hpf    





   (0-3)   


 


Ur Epithelial Cells  TRACE /lpf /lpf    





   (NONE-1+)   


 


Urine Mucus  TRACE /lpf /lpf    





   (NONE-1+)   


 


Urine Glucose  NEGATIVE     





   (NEGATIVE)   











Medications Given: 


 








Discontinued Medications





Hydrocodone Bitart/Acetaminophen (Norco 5/325mg Prepack#6)  1 btl TAKEHOME 

EDNOW ONE


   Stop: 10/31/18 20:42


   Last Admin: 10/31/18 20:48 Dose:  1 btl


Hydromorphone HCl (Dilaudid)  0.5 mg IVP EDNOW ONE


   Stop: 10/31/18 19:51


   Last Admin: 10/31/18 20:08 Dose:  0.5 mg


Sodium Chloride (Ns)  1,000 mls @ 0 mls/hr IV EDNOW ONE; Wide Open


   PRN Reason: Protocol


   Stop: 10/31/18 17:56


   Last Admin: 10/31/18 18:13 Dose:  1,000 mls


Sodium Chloride (Ns)  1,000 mls @ 0 mls/hr IV EDNOW ONE; Wide Open


   PRN Reason: Protocol


   Stop: 10/31/18 19:07


   Last Admin: 10/31/18 19:13 Dose:  1,000 mls


Lidocaine HCl 150 mg/ Sodium (Chloride)  115 mls @ 600 mls/hr IV EDNOW ONE


   Stop: 10/31/18 20:02


   Last Admin: 10/31/18 20:14 Dose:  115 mls


Ondansetron HCl (Zofran)  4 mg IVP EDNOW ONE


   Stop: 10/31/18 17:56


   Last Admin: 10/31/18 18:13 Dose:  4 mg


Ondansetron HCl (Zofran)  4 mg IVP EDNOW ONE


   Stop: 10/31/18 19:19


   Last Admin: 10/31/18 19:34 Dose:  4 mg


Tamsulosin HCl (Flomax)  0.4 mg PO EDNOW ONE


   Stop: 10/31/18 19:55


   Last Admin: 10/31/18 20:08 Dose:  0.4 mg








Departure





- Departure


Disposition: Home, Routine, Self-Care


Clinical Impression: 


 Kidney stone on right side





Condition: Fair


Instructions:  Hydrocodone/Acetaminophen (By mouth), Kidney Stones (ED)


Referrals: 


NONE *PRIMARY CARE P,. [Primary Care Provider] - As per Instructions


Reji Younger MD [Medical Doctor] - 2-3 days, call for appt.


Prescriptions: 


morphINE IR [morphINE IR 15 mg (*)] 15 mg PO Q3-4PRN PRN #10 tab


 PRN Reason: Pain, Severe


Ondansetron Odt [Zofran Odt 4 mg (RX)] 4 mg PO Q4 PRN #20 tab


 PRN Reason: Nausea & Vomiting


Tamsulosin HCl [Flomax] 0.4 mg PO DAILY #10 cap

## 2018-11-03 ENCOUNTER — HOSPITAL ENCOUNTER (EMERGENCY)
Dept: HOSPITAL 80 - FED | Age: 38
Discharge: HOME | End: 2018-11-03
Payer: SELF-PAY

## 2018-11-03 VITALS — DIASTOLIC BLOOD PRESSURE: 69 MMHG | SYSTOLIC BLOOD PRESSURE: 96 MMHG

## 2018-11-03 DIAGNOSIS — Z09: Primary | ICD-10-CM

## 2018-11-03 NOTE — EDPHY
H & P


Stated Complaint: prob post deviated septum surg on monday


Time Seen by Provider: 11/03/18 14:38


HPI/ROS: 





CHIEF COMPLAINT: Insurance problem, needs to see ENT for septoplasty follow up





HISTORY OF PRESENT ILLNESS: The patient is a 37 y/o male who underwent a 

septoplasty and turbinate reduction 5 days ago and arrives requesting post-

operative reevaluation stating his post-op ENT appointment this week was 

cancelled due to loss of insurance coverage. His surgery was performed on 10/29/

18 with Dr. Miranda and he was scheduled for suture and splint removal on 11/6/ 18, 3 days from now. Yesterday he received a phone call from the ENT office 

saying his appointment was cancelled due to loss of Medicaid status. He has 

some pain with breathing and nose stuffiness as expected following the surgery. 

No fever or other infectious symptoms. 





REVIEW OF SYSTEMS:


A ten system review of systems was performed and is negative with the exception 

of the items mentioned in the HPI.





Past medical history: Deviated nasal septum, kidney stones





Past surgical history: Septoplasty with turbinate resection





Family history: Noncontributory





Social history: Smoker. Has girlfriend. Lives in [place]. Not employed. 





General Appearance:  Alert.  Vital signs reviewed.  Focused exam performed.


ENT, Mouth:  Mucous membranes are moist, no oropharyngeal erythema or edema. 

Nose without active bleeding, splints in place.


Neck:  Supple.


Respiratory:  No distress.


Skin:  Warm and dry, no rashes on exposed skin, normal color.


Neurological:  Alert and oriented.  Moving all four extremities easily and 

equally.


Psychiatric:  Normal affect.





- Personal History


Current Tetanus Diphtheria and Acellular Pertussis (TDAP): Yes


Tetanus Vaccine Date: < 10 years





- Medical/Surgical History


Hx Asthma: No


Hx Chronic Respiratory Disease: No


Hx Diabetes: No


Hx Cardiac Disease: No


Hx Renal Disease: No


Hx Cirrhosis: No


Hx Alcoholism: Yes


Hx HIV/AIDS: No


Hx Splenectomy or Spleen Trauma: No


Other PMH: Left spontaneous pneumothorax,.  depression, PTSD, seizures, FX SPINE

, osteoporosis.  surg for deviated septum





- Social History


Smoking Status: Current every day smoker


Constitutional: 


 Initial Vital Signs











Temperature (C)  36.5 C   11/03/18 13:39


 


Heart Rate  94   11/03/18 13:39


 


Respiratory Rate  18   11/03/18 13:39


 


Blood Pressure  96/69 L  11/03/18 13:39


 


O2 Sat (%)  95   11/03/18 13:39








 











O2 Delivery Mode               Room Air














Allergies/Adverse Reactions: 


 





No Known Allergies Allergy (Verified 11/03/18 13:39)


 








Home Medications: 














 Medication  Instructions  Recorded


 


QUEtiapine FUMARATE [Seroquel 600 mg PO HS 03/10/18





300mg (*)]  


 


levETIRAcetam [Keppra 500 mg (*)] 500 mg PO BID 05/02/18


 


Acetaminophen/ASA/Caffeine 1 tab PO BID PRN 10/18/18





[Excedrin Tablet (*)]  


 


Calcium Carbonate [Oyster Shell 1,000 mg PO BID 10/18/18





Calcium 500 mg (*)]  


 


FLUoxetine HCL [Fluoxetine HCl] 80 mg PO DAILY 10/29/18


 


Tizanidine HCl 4 mg PO TID 10/29/18


 


Zonisamide [Zonegran 100MG (*)] 400 mg PO HS 10/29/18


 


Cephalexin [Keflex (*)] 500 mg PO TID #21 cap 10/30/18


 


Sodium Cl Nasal [Ocean Spray (*)] 3 spray NS Q2H PRN #1 btl 10/30/18


 


oxyCODONE/APAP 5/325 [Percocet 1 tab PO Q6H PRN #20 tab 10/30/18





5/325 (*)]  


 


Ondansetron Odt [Zofran Odt 4 mg 4 mg PO Q4 PRN #20 tab 10/31/18





(RX)]  


 


Tamsulosin HCl [Flomax] 0.4 mg PO DAILY #10 cap 10/31/18


 


morphINE IR [morphINE IR 15 mg (*)] 15 mg PO Q3-4PRN PRN #10 tab 10/31/18














Medical Decision Making


ED Course/Re-evaluation: 





 involved.





Consulted with Dr. Hawthorne, ENT and Dr. Miranda's partner. He assures me they 

will see the patient for follow up this week regardless of his insurance 

status. 





Reassessed patient and discussed Dr. Hawthorne's recommendation to call Monday 

morning to arrange follow up appointment. He is comfortable with plan and will 

be discharged with normal precautions. 





Nothing to suggest infection or post-op problem.





Departure





- Departure


Disposition: Home, Routine, Self-Care


Clinical Impression: 


 Postop check





Condition: Good


Instructions:  Septoplasty (DC)


Additional Instructions: 


I spoke with Dr. Rick Hawthorne on the phone.  He is Dr. Marilin Talavera's partner.

  He assures me that the office will see you for a follow-up appointment 

regardless of your insurance status.  He would like you to call the office on 

Monday morning and ask to speak with Jeremías.  Jeremías is Dr. Hawthorne's medical 

assistant.  She can help you sort this out and arrange a follow-up appointment 

for you.


Referrals: 


Joycelyn Ashraf NP [Primary Care Provider] - As per Instructions


Mariiln Talavera MD [Medical Doctor] - As per Instructions


Report Scribed for: Chikis Vila


Report Scribed by: Kae Fenton


Date of Report: 11/03/18


Time of Report: 15:38


Physician Review and Approval Statement: 





11/03/18 14:38


Portions of this note were transcribed by the medical scribe.  I, Dr. Chikis Vila, personally performed the history, physical exam, and medical decision-

making; and confirmed the accuracy of the information in the transcribed note.

## 2018-12-02 ENCOUNTER — HOSPITAL ENCOUNTER (EMERGENCY)
Dept: HOSPITAL 80 - FED | Age: 38
Discharge: HOME | End: 2018-12-02
Payer: SELF-PAY

## 2018-12-02 VITALS — DIASTOLIC BLOOD PRESSURE: 76 MMHG | SYSTOLIC BLOOD PRESSURE: 119 MMHG

## 2018-12-02 DIAGNOSIS — M54.5: Primary | ICD-10-CM

## 2018-12-02 DIAGNOSIS — G89.29: ICD-10-CM

## 2018-12-02 NOTE — EDPHY
H & P


Stated Complaint: back pain


Time Seen by Provider: 12/02/18 09:24


HPI/ROS: 





Chief complaint:  Back pain





History of present illness:  This is a 38-year-old male who presents to the 

emergency department for evaluation of back pain.  Patient reports he has had 

back pain since last spring.  He has a history of seizures. He reports the 

seizures have caused multiple fractures in his spine.  He was in a back brace 

for a prolonged period of time.  However his back surgeon recently took him out 

of the back brace as hefelt it was no longer helpful. The patient states he has 

continued pain.  He was going to follow up with his back surgeon in [place] but 

his Medicaid has run out.  He cannot remember the name of his back surgeon.  No 

new trauma.





Review of systems:  A 10 point review of systems was obtained and other than 

described above was negative





- Personal History


Current Tetanus/Diphtheria Vaccine: Yes


Current Tetanus Diphtheria and Acellular Pertussis (TDAP): Yes


Tetanus Vaccine Date: < 10 years





- Medical/Surgical History


Hx Asthma: No


Hx Chronic Respiratory Disease: No


Hx Diabetes: No


Hx Cardiac Disease: No


Hx Renal Disease: No


Hx Cirrhosis: No


Hx Alcoholism: Yes


Hx HIV/AIDS: No


Hx Splenectomy or Spleen Trauma: No


Other PMH: Left spontaneous pneumothorax,.  depression, PTSD, seizures, FX SPINE

, osteoporosis.  surg for deviated septum





- Social History


Smoking Status: Current every day smoker





- Physical Exam


Exam: 





General Appearance:  Alert, nontoxic


Eyes:  PERRLA


Respiratory:  Lungs clear to auscultation bilaterally 


Cardiac: Regular rate and rhythm.


Neurological:  Alert and oriented x4.  Strength and sensation intact and 

symmetrical.


Skin:  No lesions consistent with acute trauma noted.


Musculoskeletal:  The head is nontender.  The spine is nontender along its 

entire length, no crepitus, bony deformity or step-off appreciated.  Chest wall 

intact palpation.  Patient is moving extremities well.  He is ambulating on his 

own.  





Constitutional: 


 Initial Vital Signs











Temperature (C)  36.5 C   12/02/18 09:12


 


Heart Rate  77   12/02/18 09:12


 


Respiratory Rate  16   12/02/18 09:12


 


Blood Pressure  110/72   12/02/18 09:12


 


O2 Sat (%)  98   12/02/18 09:12








 











O2 Delivery Mode               Room Air














Allergies/Adverse Reactions: 


 





No Known Allergies Allergy (Verified 12/02/18 09:10)


 








Home Medications: 














 Medication  Instructions  Recorded


 


levETIRAcetam [Keppra 500 mg (*)] 500 mg PO BID 05/02/18


 


Tizanidine HCl 4 mg PO TID 10/29/18


 


Zonisamide [Zonegran 100MG (*)] 400 mg PO HS 10/29/18














Medical Decision Making





- Diagnostics


Imaging Results: 


 Imaging Impressions





Lumbar Spine X-Ray  12/02/18 09:29


Impression: Minimal, (less than 10%), age indeterminant L1 compression fracture.








Thoracic Spine X-Ray  12/02/18 09:29


Impression: Mild age indeterminate T11 compression fracture.











Imaging: Discussed imaging studies w/ On call Radiologist


ED Course/Re-evaluation: 





Patient is discussed with my secondary supervising physician Dr. Shay Melo.  

Patient presents for persistent back pain.  He states he has had it since 

seizures resulted in fractures in his spine last spring.  No new trauma is 

reported.  He has been unable to follow up with his spine doctor in [place].  He 

is requesting x-rays to assess the stability of his spine.  He has a nonfocal 

neurologic exam.  X-rays do reveal compression fractures that do appear to be 

old given no recent trauma noted.  He is discharged home.  Home care is 

discussed.  He is asked to follow up with either his spine doctor or our spine 

doctor and he is given referral information.  Strict return precautions are 

given.  The patient voiced understanding and agreement with plan.


Differential Diagnosis: 





Included but not limited to back sprain/strain, compression fractures, 

herniated intervertebral disc





Departure





- Departure


Disposition: Home, Routine, Self-Care


Clinical Impression: 


Back pain


Qualifiers:


 Back pain location: low back pain Chronicity: chronic Back pain laterality: 

midline Sciatica presence: without sciatica Qualified Code(s): M54.5 - Low back 

pain; G89.29 - Other chronic pain; G89.29 - Other chronic pain





Condition: Good


Instructions:  Back Pain (ED)


Additional Instructions: 


Follow-up with your spine doctor for continued evaluation and care





You can also follow up with our spine doctor





Use ibuprofen 600 mg 3 times a day for the next 2-3 days for pain control





If symptoms worsen or new symptoms develop return to the emergency room for 

recheck


Referrals: 


NONE *PRIMARY CARE P,. [Primary Care Provider] - As per Instructions


Tam Manley MD [Medical Doctor] - As per Instructions


[facility],. [Clinic] - As per Instructions

## 2018-12-18 ENCOUNTER — HOSPITAL ENCOUNTER (INPATIENT)
Dept: HOSPITAL 80 - FED | Age: 38
LOS: 3 days | Discharge: HOME | DRG: 885 | End: 2018-12-21
Attending: PSYCHIATRY & NEUROLOGY | Admitting: PSYCHIATRY & NEUROLOGY
Payer: SELF-PAY

## 2018-12-18 DIAGNOSIS — R44.0: ICD-10-CM

## 2018-12-18 DIAGNOSIS — F32.3: Primary | ICD-10-CM

## 2018-12-18 DIAGNOSIS — Z87.442: ICD-10-CM

## 2018-12-18 DIAGNOSIS — F17.210: ICD-10-CM

## 2018-12-18 DIAGNOSIS — D64.9: ICD-10-CM

## 2018-12-18 DIAGNOSIS — F43.10: ICD-10-CM

## 2018-12-18 DIAGNOSIS — M54.9: ICD-10-CM

## 2018-12-18 DIAGNOSIS — M81.0: ICD-10-CM

## 2018-12-18 DIAGNOSIS — G40.909: ICD-10-CM

## 2018-12-18 LAB — PLATELET # BLD: 273 10^3/UL (ref 150–400)

## 2018-12-18 PROCEDURE — G0480 DRUG TEST DEF 1-7 CLASSES: HCPCS

## 2018-12-18 NOTE — ASMTTLCEVL
[facility] Evaluation - Basic Information

 

Evaluation Start Date and     12/18/2018 02:30 AM

Time                          

Hospital Status               Answers:  Voluntary                             

Patient statement             

Notes:

"I'm in a dark place. I'm off my meds and hearing voices to do things. I have PTSD and severe 

depression and want to hurt people at work and my roommate. I came in hear because I don't want 

things to get worse. I'm not sleeping or eating."

Narrative                     

Notes:

PT is a 38 year old  employed,  male. PT said he has been off of his medication for 


about a month because he lost his Medicaid because he is making to much at his work at [employer]. PT 

reported that he wanted to spray windex in the eyes of a co-worker because he was annoying him. PT 

reported that when he is on his meds he doesn't have these dark thoughts. 

Diagnosis History             

Notes:

PTSD, Depression

Prior suicide attempts        

Notes:

PT denies 

Prior hospitalizations        

Notes:

PT reported he has been hospitalized but cannot remember when.

Treatment Responses           

Notes:

PT reports that when he is on hi meds he does well.

History of violence           

Notes:

Denies but said that he can get into verbal altercations with people.

Therapist:                    [facility]

Psychiatrist:                 Dr. Mercado

Medications                   

(name, dosage, route, freq    

uency)                        

Notes:

Prozac

Seroquel

Allergies/Reaction            

Notes:

None except seasonal allergies.

Sleep                         

Notes:

Not sleeping but not able to say how much sleep.

Appetite                      

Notes:

Loss of appetite.

Medical/Surgical history      

Notes:

Had sinus surgery this Fall.

Substance use history         

(frequency, intensity, his    

tory, duration)               

Notes:

Currently uses marijuana but denies current use of any other substances. Past use of cocaine and 

methamphetamine

Family composition            

Notes:

, ne child from another partner daughter who is 12 years old and lives in [place]. PT 

reports that his family lives in [place] and said he doesn't have contact with them right now.

Need for family               Answers:  Yes                                   

participation in                                                              

patient's care                                                                

Family                        

psychiatric/substance         

abuse history                 

Notes:

PT said he thinks there is both mental health and substance abuse issues but couldnt give any 

details.

Developmental history         

Notes:

PT said he couldn't remember much.

Abuse concerns                Answers:  Past                                  

Marital status/children       

Notes:

 wife lives with him. His daughter lives in [place], he said he has contact with her.

Living situation              

Notes:

Lives wiht his wife.

Sexual                        

history/orientation           

Notes:

Heterosexual

Peer support/family           

strengths                     

Notes:

Said he doesn't have any friends, but is close to his wife.

Education level/history       

Notes:

Graduated from high school and has taken some college courses.

Work history                  

Notes:

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

                      

Notes:

Denied

Legal                         

Notes:

Denied

Mormonism/Spiritual           

Notes:

Evangelical

Leisure                       

Notes:

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

Collateral                    

Notes:

Wife

Patient's strengths           Answers:  Funny/Using Humor                     

(Please select at least                                                       

TWO strengths):                                                               

                                        Honest                                

                                        Intelligent                           

                                        Motivated for Treatment               

[facility] Evaluation - Mental Status Exam

 

Appearance:                   Answers:  Appropriate                           

                                        Clean                                 

Eye Contact:                  Answers:  Good/Direct                           

Mood:                         Answers:  Depressed                             

                                        Irritable                             

Affect:                       Answers:  Agitated                              

                                        Anxious                               

                                        Apprehensive                          

                                        Distracted                            

                                        Irritable                             

Behavior:                     Answers:  Cooperative                           

                                        Anxious                               

Speech:                       Answers:  Illogical                             

                                        Loose Associations                    

Thought Process:              Answers:  Disorganized                          

                                        Paranoid                              

Insight:                      Answers:  Poor                                  

Judgement:                    Answers:  Poor                                  

Manic Signs/Symptoms          Answers:  Impulsivity                           

                                        Irritability                          

                                        Mood Swings                           

Hallucinations:               Answers:  Auditory                              

Pt reported to have           Answers:  No                                    

suicidal/self-injuring                                                        

ideation/behavior?                                                            

Pt reported to be making      Answers:  No                                    

suicidal/self-injuring                                                        

threats?                                                                      

Pt reported to have           Answers:  Yes                                   

aggression/assault                                                            

ideation/behavior?                                                            

Pt reported to be making      Answers:  No                                    

aggression/assault                                                            

threats?                                                                      

Pt exhibits inability to      Answers:  Yes                                   

care for self/grave                                                           

disability?                                                                   

Ideation/behavior is          Answers:  No                                    

chronic?                                                                      

Patient has a specific        Answers:  No                                    

plan?                                                                         

Pt has access to means to     Answers:  No                                    

execute the plan?                                                             

Ideation has                  Answers:  No                                    

delusional/hallucinatory                                                      

content?                                                                      

History of                    Answers:  No                                    

suicidal/self-injuring                                                        

ideation, behavior, or                                                        

threats?                                                                      

History of                    Answers:  No                                    

aggressive/assaultive                                                         

ideation, behavior, or                                                        

threats?                                                                      

History of serious            Answers:  No                                    

physical harm to                                                              

self/others while in                                                          

treatment setting?                                                            

[facility] Evaluation - Suicide/Homicide Risk

 

Suicide Risk Factors:         Answers:  < 20 or > 40 Years of Age             

                                        Agitation                             

                                        History of Abuse                      

                                        Lack of Social Support                

                                        Major Depression                      

                                        Psychotic Disorder                    

Homicide/violence risk        Answers:  Command Hallucinations                

factors:                                                                      

Current Suicidal              Answers:  No                                    

Ideation?                                                                     

Current Suicidal Ideation     Answers:  No                                    

in the Past 48 Hours?                                                         

Current Suicidal Ideation     Answers:  No                                    

in the Past Month?                                                            

Current Suicidal              Answers:  No                                    

Ideation, Worst Ever?                                                         

Suicide Internal              Answers:  None                                  

Protective Factors:                                                           

Ranking of patient's          Answers:  Low                                   

suicidal risk:                                                                

Ranking of patient's          Answers:  Low                                   

homicidal risk:                                                               

[facility] Evaluation - Wrap-up

 

BDI Total Score:              unable to fill out

BDI Question #2 Score:        unable to fill out

BDI Question #9 Score:        unable fill out

BSS Total Score:              unable fill out

AXIS I Diagnosis (include     

DSM-V and ICD-10              

codes), must also be          

entered in                    

[employer], which is the        

source of truth.              

Notes:

Major Depressive Disorder, recurrent, with psychotic features  296.34  (F33.3)

Evaluation End Date and       12/18/2018 09:57 PM

Time (HH:MM):                 

 

Date Signed:  12/18/2018 09:59 PM

Electronically Signed By:Claritza Nation

## 2018-12-18 NOTE — ASMTTCLDSP
[facility] Discharge Disposition

 

Disposition:                  Answers:  Admit                                 

Disposition Notes:            

Notes:

IN CONSULTATION WITH [facility] ED PHYSICIAN, BECKY MCMILLAN MD AND ON-CALL PSYCHIATRIST, JOHANA GUZMAN MD, BOTH CONCURRED THAT PT APPEARS TO MEET 27-65 CRITERIA REQUIRING PSYCHIATRIC 

HOSPITALIZATION AS PT APPEARS TO BE AN IMMINENT RISK OF HARM TO OTHERS DUE TO A MENTAL ILLNESS 

CONDITION. PT WAS GIVEN THE 3 PROHIBITED BELONGINGS LIST WHILE IN THE ED.

Discharge                     

Concerns/Recommendations:     

Notes:

Admitted to [address] [place] [address].

Was patient given the         Answers:  Yes                                   

Inpatient Behavioral Health Prohibited                                                             

Belongings List while in                                                      

the ED?                                                                       

For inpatient                 Dr Johana Guzman

admission, the following      

psychiatrist agreed to        

accept patient for            

admission to Behavioral Health ([facility]):              

Type of Hold:                 Answers:  M1/72-hour Hold                       

Hold initiated by:            Answers:  ED Physician                          

 

Date Signed:  12/18/2018 10:40 PM

Electronically Signed By:Claritza Nation

## 2018-12-18 NOTE — EDPHY
H & P


Time Seen by Provider: 12/18/18 20:06


HPI/ROS: 





CHIEF COMPLAINT:  "The dark voices are getting worse"





HISTORY OF PRESENT ILLNESS:  38-year-old man has a history of PTSD and 

depression, has been off his psychiatric medications for about 1 month.  He 

says the"dark voices" are getting worse in their telling him to be more 

aggressive toward others.  He said yesterday he throughout his roommate saying 

that if he came back he would "castrate him or burn him alive."  He works at a 

grocery store, [employer], and says that yesterday a customer was taking extra 

time to leave the bathroom and the voices were suggesting that he take the 

chemical spray bottle and use it on the customer.  He says he does not like the 

way the voices make him act and feel and he does not want to be violent towards 

other people.  He is worried that if this continues and gets worse that he 

would eventually do what they are telling him.





REVIEW OF SYSTEMS:


Eye: no change in vision


ENT: no sore throat


Cardiac: no chest pain or syncope


Pulmonary: no cough or SOB


Abdomen: no vomiting, diarrhea, abdominal pain


Musculoskeletal:  Chronic back pain unchanged


Skin:  No laceration


Neuro: no headache


Constitutional: no fever


: no urinary symptoms





A comprehensive 10 point review of systems is otherwise negative aside from 

elements mentioned in the history of present illness.





PAST MEDICAL HISTORY:  Includes seizure disorder on Keppra, depression, PTSD, 

spine fracture and left pneumothorax





Social history:  Denies alcohol, is a tobacco smoker.





General Appearance: Alert and conversant, cooperative.


Eyes: No scleral  icterus. 


ENT, Mouth: Normal mucous membranes.


Respiratory: Normal respiratory effort, breath sounds equal, lungs are clear to 

auscultation.


Cardiovascular:  Regular rate and rhythm.


Gastrointestinal:  Abdomen is soft and non tender.


Neurological: Alert, face symmetric, normal motor and sensory in extremities. 


Skin:  No lacerations or bruising.


Musculoskeletal: No peripheral edema.


Psychiatric:  The patient admits to auditory hallucinations but denies suicidal 

ideation.  He says he has some thoughts of hurting others as documented in the 

HPI but says he would not do it because he"does not want to be in court" the 

and tell to  "yes that was me."





Emergency Department course/MDM:





2216:  Placed on a mental health hold for danger to others with worsening 

thoughts of harming others.  This is after further interview with the 

consultant [facility] evaluator.  He has also told me he is thinking of poisoning his 

coworkers at the grocery store with rat poison.


The patient will be transferred to [facility] for inpatient 

psychiatric hospital bed not available at this facility, in stable condition; 

accepting physician is Dr. Guzman.  EMTALA form completed.


Smoking Status: Current every day smoker


Constitutional: 


 Initial Vital Signs











Temperature (C)  36.7 C   12/18/18 19:50


 


Heart Rate  82   12/18/18 19:50


 


Respiratory Rate  16   12/18/18 19:50


 


Blood Pressure  124/60 H  12/18/18 19:50


 


O2 Sat (%)  100   12/18/18 19:50








 











O2 Delivery Mode               Room Air














Allergies/Adverse Reactions: 


 





No Known Allergies Allergy (Verified 12/18/18 19:54)


 








Home Medications: 














 Medication  Instructions  Recorded


 


levETIRAcetam [Keppra 500 mg (*)] 500 mg PO BID 05/02/18


 


Tizanidine HCl 4 mg PO TID 10/29/18


 


Zonisamide [Zonegran 100MG (*)] 400 mg PO HS 10/29/18


 


Prozac 10 MG (*)  12/18/18


 


Seroquel  12/18/18














Medical Decision Making





- Data Points


Laboratory Results: 


 Laboratory Results





 12/18/18 21:15 





 12/18/18 21:15 





 











  12/18/18 12/18/18 12/18/18





  21:30 21:15 21:15


 


WBC      7.63 10^3/uL 10^3/uL





     (3.80-9.50) 


 


RBC      4.58 10^6/uL 10^6/uL





     (4.40-6.38) 


 


Hgb      12.1 g/dL L g/dL





     (13.7-17.5) 


 


Hct      38.0 % L %





     (40.0-51.0) 


 


MCV      83.0 fL fL





     (81.5-99.8) 


 


MCH      26.4 pg L pg





     (27.9-34.1) 


 


MCHC      31.8 g/dL L g/dL





     (32.4-36.7) 


 


RDW      13.2 % %





     (11.5-15.2) 


 


Plt Count      273 10^3/uL 10^3/uL





     (150-400) 


 


MPV      10.6 fL fL





     (8.7-11.7) 


 


Neut % (Auto)      63.3 % %





     (39.3-74.2) 


 


Lymph % (Auto)      25.3 % %





     (15.0-45.0) 


 


Mono % (Auto)      8.8 % %





     (4.5-13.0) 


 


Eos % (Auto)      1.8 % %





     (0.6-7.6) 


 


Baso % (Auto)      0.5 % %





     (0.3-1.7) 


 


Nucleat RBC Rel Count      0.0 % %





     (0.0-0.2) 


 


Absolute Neuts (auto)      4.83 10^3/uL 10^3/uL





     (1.70-6.50) 


 


Absolute Lymphs (auto)      1.93 10^3/uL 10^3/uL





     (1.00-3.00) 


 


Absolute Monos (auto)      0.67 10^3/uL 10^3/uL





     (0.30-0.80) 


 


Absolute Eos (auto)      0.14 10^3/uL 10^3/uL





     (0.03-0.40) 


 


Absolute Basos (auto)      0.04 10^3/uL 10^3/uL





     (0.02-0.10) 


 


Absolute Nucleated RBC      0.00 10^3/uL 10^3/uL





     (0-0.01) 


 


Immature Gran %      0.3 % %





     (0.0-1.1) 


 


Immature Gran #      0.02 10^3/uL 10^3/uL





     (0.00-0.10) 


 


Sodium    138 mEq/L mEq/L  





    (135-145)  


 


Potassium    4.1 mEq/L mEq/L  





    (3.5-5.2)  


 


Chloride    108 mEq/L mEq/L  





    ()  


 


Carbon Dioxide    22 mEq/l mEq/l  





    (22-31)  


 


Anion Gap    8 mEq/L mEq/L  





    (6-14)  


 


BUN    22 mg/dL mg/dL  





    (7-23)  


 


Creatinine    1.3 mg/dL mg/dL  





    (0.7-1.3)  


 


Estimated GFR    > 60   





    


 


Glucose    100 mg/dL mg/dL  





    ()  


 


Calcium    9.0 mg/dL mg/dL  





    (8.5-10.4)  


 


Urine Opiates Screen  NEGATIVE     





   (NEGATIVE)   


 


Urine Barbiturates  NEGATIVE     





   (NEGATIVE)   


 


Ur Phencyclidine Scrn  NEGATIVE     





   (NEGATIVE)   


 


Ur Amphetamine Screen  NEGATIVE     





   (NEGATIVE)   


 


U Benzodiazepines Scrn  NEGATIVE     





   (NEGATIVE)   


 


Urine Cocaine Screen  NEGATIVE     





   (NEGATIVE)   


 


U Marijuana (THC) Screen  NEGATIVE     





   (NEGATIVE)   


 


Ethyl Alcohol    < 10 mg/dL mg/dL  





    (0-10)  














Departure





- Departure


Disposition: [facility] IP


Clinical Impression: 


 PTSD (post-traumatic stress disorder), Acute psychosis





Condition: Good


Instructions:  Post Traumatic Stress Disorder (ED)


Referrals: 


NONE *PRIMARY CARE P,. [Primary Care Provider] - As per Instructions

## 2018-12-19 RX ADMIN — ACETAMINOPHEN, ASPIRIN AND CAFFEINE PRN EACH: 250; 250; 65 TABLET, FILM COATED ORAL at 09:34

## 2018-12-19 RX ADMIN — ACETAMINOPHEN, ASPIRIN AND CAFFEINE PRN EACH: 250; 250; 65 TABLET, FILM COATED ORAL at 20:07

## 2018-12-19 RX ADMIN — ZONISAMIDE SCH MG: 100 CAPSULE ORAL at 20:09
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
## 2018-12-19 NOTE — ASMTCMCOM
CM Note

 

CM Note                       

Notes:

Pt and CC completed MTP and placed in pt's chart. 



Pt. requested to be called "[place]". Pt. denied any current legal issues. Pt. denied alcohol 

use. Pt. reports smoking THC "a little bit" adding he last smoked a "couple of nights 

ago". Pt. denied all other substance use. Pt. denied SI. Pt. reports HI, but denied to share who 

it's directed at, but reports it is not someone currently on the unit. Pt. reports VH of "dark 

images". Pt. reports having paranoia "always" adding it is related to his PTSD. 



Pt. presents as alert, guarded,  passive in his answers, poor to fair eye contact, and 

cooperative. Staff report pt. sleeping 4 hours and being medication compliant. 

 

Date Signed:  12/19/2018 11:14 AM

Electronically Signed By:Meg Dempsey

## 2018-12-19 NOTE — PDMN
Medical Necessity


Medical necessity: Pt meets inpt criteria per MD order and MCG B-[id number], Major 

Depressive Disorder, Adult: Inpatient Care, 3 days. 37 y/o admitted w/Major 

Depressive Disorder, recurrent, w/psychotic features, on M1 Hold due to risk of 

harm to others due to a mental illness, requires inpt psychiatric 

hospitalization.

## 2018-12-19 NOTE — BCON
INTERNAL MEDICINE CONSULTATION.



DATE OF CONSULTATION:  12/19/2018



REFERRING PHYSICIAN:  Yvonne Guzman MD





REASON FOR REFERRAL:  Medical clearance for inpatient behavioral health stay.



HISTORY OF PRESENT ILLNESS:  This patient came to the emergency department 
yesterday complaining of "dark voices" getting worse and telling him to be 
aggressive toward others.  He was evaluated by the mental health team and 
admitted for further psychiatric care. 



He currently complains of back pain and reports this has been a chronic issue 
since injury he sustained when he had seizures.  He has a seizure disorder for 
which he takes levetiracetam.  He reports that his wife thought she saw him 
having a seizure several days ago, but he does not know what she actually 
witnessed and has no recollection himself of the event.  He is otherwise 
without any acute complaints.



PAST MEDICAL HISTORY:  

1.  Psychiatric diagnoses including depression and PTSD.

2.  Seizure disorder.

3.  Vertebral compression fractures at T11 and L1.

4.  Osteoporosis by DEXA in August of 2018.

5.  Nephrolithiasis and right ureteral calculus on abdominal CT in October of 2018.



PAST SURGICAL HISTORY:  He had a recent septoplasty.



MEDICATIONS:  Prior to admission:

1.  Levetiracetam 500 mg p.o. twice daily.

2.  Zonisamide 400 mg p.o. at bedtime.

3.  Quetiapine 600 mg p.o. at bedtime.

4.  Fluoxetine 80 mg p.o. daily.



ALLERGIES:  There are no known drug allergies.



SOCIAL HISTORY:  He reports that he is .  He works at [employer].  He is a 
smoker.



FAMILY HISTORY:  Noncontributory.



REVIEW OF SYSTEMS:  He has back pain.  He thought it would improve with healing 
of his fractures.  However, the actual x-ray diagnosis of the fractures appears 
to have been just 2-1/2 weeks ago.  He says Excedrin is helpful to take the 
edge off but does not resolve the pain and he hurts worse if he is sitting up 
or standing.  This has impaired his function at work.  He denies headache, 
neurologic symptoms including weakness, numbness or tingling of the extremities
, vision changes or difficulty swallowing, weight change, cough or dyspnea, 
chest pain or palpitations, nausea, vomiting, constipation, or diarrhea.  
Otherwise, a 10-point review of systems is negative.



PHYSICAL EXAM:  VITAL SIGNS:  Blood pressure is 128/84, heart rate is 66, 
respiratory rate is 16, oxygen saturation is 99% on room air, temperature is 
36.5 degrees centigrade.  His weight is 77 kg for a body mass index of 22.5.  
GENERAL:  This is a well-nourished, well-developed man, appears his chronologic 
age, lying in bed, sits up for the examination, cooperative and in no acute 
distress.  HEENT: Extraocular movements are intact.  Pupils are equal, round, 
reactive to light.  Mucous membranes are moist.  Dentition is in good 
condition.  He has an uncrowded airway, Mallampati class 1.  NECK:  Supple.  
HEART: Regular rate and rhythm with no murmurs, rubs, or gallops.  LUNGS: Clear 
to auscultation bilaterally.  ABDOMEN:  Benign.  EXTREMITIES:  There is no 
cyanosis, clubbing, or edema.  NEUROLOGIC:  He is alert and oriented x3.  
Cranial nerves 2-12 are grossly intact.  There is no focal weakness.  Sensation 
is intact to light touch.  Deep tendon reflexes are 2+ bilaterally at the biceps
, patella and Achilles tendons and gait is normal.



LABORATORY STUDIES:  Drawn yesterday in the emergency department.  CBC revealed 
slight anemia with a hemoglobin of 12.1 and hematocrit of 38.  MCV was normal 
at 83, mean cellular hemoglobin and mean cellular hemoglobin content were both 
slightly low at 26.4 and 31.8.  Serum chemistry revealed normal renal function 
and electrolytes.  Calcium was normal.  Hemoglobin A1c was borderline elevated 
at 6.0.  Liver function tests were normal including no elevation of alkaline 
phosphatase.  Lipid panel revealed a normal cholesterol at 182 and elevated LDL 
at 112 and a normal HDL at 51.  Toxicology screen in the serum was negative for 
ethyl alcohol and the urine was negative for any substances of abuse.



ASSESSMENT/RECOMMENDATIONS:  

1.  Mental health issues pending further evaluation and management per 
Psychiatry and the mental health team.

2.  Anemia of unclear etiology.  He had recent surgery, but estimated blood 
loss was minimal, so doubt that this is postsurgical.  It has been present on 
several previous medical encounters.  I will add on an iron panel and 
reticulocyte count to further characterize the anemia.  He should follow up 
with primary care after discharge to evaluate for occult GI blood loss and to 
consider referral to Gastroenterology.

3.  Seizure disorder, on 2 anticonvulsants.  He reports seizure several days ago
, but it is unclear whether this really occurred.  He had an EEG with no 
epileptogenic foci.  He has had a normal brain MRI.  Advise continuing his 
anticonvulsants and he can follow up with his neurologist, Dr. Saúl Hansen.  
Would avoid any medications that might lower the seizure threshold.

4.  Back pain with history of compression fractures.  Acetaminophen has been 
prescribed.  I will add ibuprofen and he has plans to follow up with a spine 
specialist.

5.  Osteoporosis with concurrent nephrolithiasis.  He might have a disorder of 
calcium metabolism, though his current labs are not suggestive.  I will add on 
a vitamin D level to rule out a low vitamin D.  This can be a complication of 
anticonvulsants as well.  Consider follow up with Endocrinology after 
discharge. 



I see no medical contraindications to this patient's continued stay on the 
inpatient behavioral health unit or to any psychiatric medications or 
procedures. 



Thank you very much for including me in the care of this patient and please do 
not hesitate to contact me or the hospitalist service should there be need for 
further medical evaluation.





Job #:  [id number]

MTDD
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
## 2018-12-20 VITALS — SYSTOLIC BLOOD PRESSURE: 110 MMHG | DIASTOLIC BLOOD PRESSURE: 68 MMHG

## 2018-12-20 RX ADMIN — IRON SUPPLEMENT SCH MG: 325 TABLET ORAL at 12:34

## 2018-12-20 RX ADMIN — IBUPROFEN PRN MG: 600 TABLET ORAL at 20:04

## 2018-12-20 RX ADMIN — ACETAMINOPHEN, ASPIRIN AND CAFFEINE PRN EACH: 250; 250; 65 TABLET, FILM COATED ORAL at 18:31

## 2018-12-20 RX ADMIN — ALUMINUM HYDROXIDE, MAGNESIUM HYDROXIDE, DIMETHICONE PRN ML: 200; 200; 20 SUSPENSION ORAL at 09:11

## 2018-12-20 RX ADMIN — ALUMINUM HYDROXIDE, MAGNESIUM HYDROXIDE, DIMETHICONE PRN ML: 200; 200; 20 SUSPENSION ORAL at 01:18

## 2018-12-20 RX ADMIN — IBUPROFEN PRN MG: 600 TABLET ORAL at 08:54

## 2018-12-20 RX ADMIN — ALUMINUM HYDROXIDE, MAGNESIUM HYDROXIDE, DIMETHICONE PRN ML: 200; 200; 20 SUSPENSION ORAL at 22:58

## 2018-12-20 RX ADMIN — ACETAMINOPHEN, ASPIRIN AND CAFFEINE PRN EACH: 250; 250; 65 TABLET, FILM COATED ORAL at 09:11

## 2018-12-20 RX ADMIN — ZONISAMIDE SCH MG: 100 CAPSULE ORAL at 20:05

## 2018-12-20 NOTE — ASMTCMCOM
CM Note

 

CM Note                       

Notes:

This writer spoke with Imelda Hester, [facility] , to coordinate follow up care for the 

patient. The patient's next scheduled appointment with Imelda is on December 26th @ 13:30. The 

patient refused to participate in clinical treatment team rounds; without explanation. According to 


[facility] staff, he is "quiet and withdrawn" on the unit and medication compliant. 

 

Date Signed:  12/20/2018 11:53 AM

Electronically Signed By:Shantell Delgado

## 2018-12-20 NOTE — SOAPPROG
SOAP Progress Note


Assessment/Plan: 


Assessment:





Major Depressive Disorder, Severe, with mood congruent psychotic features, PTSD

, Epilepsy.  Improvement noted (see subjective/objective note).  Patient could 

benefit from continued inpatient hospitalization for crisis stabilization, 

safety, and medication evaluation.  Patient likely discharge tomorrow. 








Plan:





1. Psychotropic medications: After reviewing options, risks, and benefits 

patient agrees to continue current medications with following changes: increase 

Prazosin to 2 mg po QHS, increase Seroquel to 300 mg po QHS, and increase 

Prozac to 40 mg po QD.  No other medication changes at this time as more time 

is needed to determine ongoing tolerability and efficacy.  Plan is to continue 

to observe patient for response and side effects from medications, and ongoing 

monitoring and evaluation.  


2. Review with patient informed consent and recommendations for psychotropic 

medication treatment listed below


3. Labs: no additional labs at this time


4. Therapy: continue milieu and group therapy  


5. Further investigation including gathering information from patients 

relatives and review of past case records to inform treatment plan.


6. Safety/Wellness plan and follow-up outpatient appointments to be established 

prior to discharge.  Next steps are for patient to meet with care manager to 

plan a safe discharge plan and establish outpatient services for ongoing 

treatment.  


7. Confer with inpatient treatment team regarding treatment plan.  


8. Psychosocial stressors addressed through .


9. Legal status: voluntary


10. Consider discharge on Friday if patient is in stable condition, safe, and 

has a safe discharge plan.   








PSYCHOTROPIC MEDICATION TREATMENT INFORMED CONSENT and RECOMMENDATIONS: Review 

nature of condition, diagnosis, and prognosis.  Review nature and purpose of 

psychotropic medication treatment.  Review type of psychotropic medications 

being ordered.  Review risk and benefits of psychotropic medication treatment.  

Review probable length of time patient will need to take medications.  Review 

risk and benefits of not undergoing psychotropic medication treatment.  Review 

alternative treatments to psychotropic medications.  Review psychotropic 

medications contraindications, drug-drug interactions, side effects, and 

importance of reporting any side effects to a psychiatric provider or nurse 

during inpatient hospitalization, and upon discharge to patients psychiatric 

outpatient provider, primary care provider, or other health care professional.  

Review importance of asking a nurse, psychiatric provider, or primary care 

provider any questions or problems concerning the psychotropic medications.  

Verify patient understands the information that has been provided, and 

understands, accepts, and agrees to psychotropic medications.  





Review patients safety plan and importance of patient to report to staff while 

hospitalized if patient is ever a danger to self/others, or unable to care for 

self, and upon discharge, the importance for patient to contact [facility] or [facility], or go to the nearest emergency room, if patient is ever a 

danger to self/others, or unable to care for self.  Recommend that upon 

discharge patient establish medication management treatment with a psychiatric 

provider, establishes routine therapy appointments, and follow-up with primary 

care provider.  Verify patient understands and agrees to these recommendations.








12/20/18 08:56





Subjective: 


Following up with patient for evaluation of depression, psychosis, and safety.  

Patient reports, "Didn't sleep very well last night.  Had a nightmare, and 

these beds are not comfortable."  Patient expresses the following psychiatric 

symptoms severe depression.  Patient denies AH.  Patient reports taking 

medications as prescribed, and describes response to medications as fair.  

Patient does not report undesirable side effects from the medications, and 

agrees to continue current medications.   Patient describes getting 6 hours of 

sleep, reports sleep was on and off throughout the night.  Patient agrees to 

increase Prazosin to 2 mg po QHS, increase Seroquel to 300 mg po QHS, and 

increase Prozac to 40 mg po QD.  Patient reports doses prior to admission: 

Prozac 80 mg po QD (not taken for 2 days prior to admission and Seroquel 600 mg 

po QHS, not taken for several weeks prior to admission).    





Objective: 





 Vital Signs











Temp Pulse Resp BP Pulse Ox


 


 36.5 C   78   14   110/68   96 


 


 12/20/18 06:00  12/20/18 06:00  12/20/18 06:00  12/20/18 06:00  12/20/18 06:00








NURSING REPORT: Consulted with nursing for update on patients progress in 

treatment.  Nurses report patient is engaged in treatment, is attending groups, 

slept 8 hours, expresses the following psychiatric symptoms: depression, 

exhibits the following psychiatric symptoms: none, is eating all meals, is 

agreeable to medications and taking as prescribed with no report of side effects

, with no s/s of EPS/akathisia, and denies SI/HI, denies A/V hallucinations, 

and denies delusions.








MSE: The patient is a well-nourished male looking stated chronological age.  

Attire is appropriate dress is casual.  Grooming status is appropriate.  

Ambulation is independent.  Gait is normal and coordinated.  Posture is normal 

and relaxed.  Eye contact is appropriate.  Motor activity is appropriate with 

purposeful, organized, coordinated movements; with no involuntary movements.  

Attitude is cooperative.  Patient appears attentive and relates well to this 

interviewer.  Language production is spontaneous.  R/R/V normal.  Articulation 

is clear.  Patient reports mood as depressed with congruent affect.  Patients 

thought process is linear and logical with no signs of thought disorder.  

Patient does not report suicidal/homicidal thoughts, ideas, or plans.  Patient 

denies auditory, visual hallucinations.  Patient does not report delusions.  

Patient does not appear to be attending to internal stimuli.  Patients 

attention and concentration are adequate.  Patient is oriented to person, place

, time.  Patients insight is poor.  Patients judgment is poor. 











- Time Spent With Patient


Time Spent With Patient: 


15 minutes, met with patient individually.








- Pending Discharge


Pending Discharge Within 24 Hours: No


Pending Discharge Within 48 Hours: No





ICD10 Worksheet


Patient Problems: 


 Problems











Problem Status Onset


 


Acute psychosis Acute  


 


PTSD (post-traumatic stress disorder) Acute  


 


Nasal obstruction Acute

## 2018-12-21 RX ADMIN — ALUMINUM HYDROXIDE, MAGNESIUM HYDROXIDE, DIMETHICONE PRN ML: 200; 200; 20 SUSPENSION ORAL at 05:21

## 2018-12-21 RX ADMIN — ACETAMINOPHEN, ASPIRIN AND CAFFEINE PRN EACH: 250; 250; 65 TABLET, FILM COATED ORAL at 08:20

## 2018-12-21 RX ADMIN — IRON SUPPLEMENT SCH MG: 325 TABLET ORAL at 08:20

## 2018-12-21 NOTE — BDS
REASON FOR ADMISSION:  From the ED note dated 12/18/2018, patient reported dark 
voices that were getting worse.  Patient reported being off his psychiatric 
medications for approximately 1 month, reported dark voices getting worse, 
telling him to be aggressive toward others.  Patient reported concerns that if 
what he was experiencing worsened, that he would eventually act on these 
thoughts.  Patient was admitted voluntarily due to feeling a danger toward 
others.  Patient was admitted for safety, crisis stabilization, and medication 
management.



ADMITTING DIAGNOSES:  

1.  Major depressive disorder, recurrent episode, severe, with mood congruent 
psychotic features.

2.  Posttraumatic stress disorder.  

3.  Epilepsy.



ADMISSION PHYSICAL EXAM:  Patient was seen for an Internal Medicine 
consultation on 12/19/2018, for medical clearance for inpatient psychiatric 
hospitalization and treatment.  Patient was medically cleared for inpatient 
psychiatric hospitalization and treatment.  For further details, please refer 
to consultation note dated 12/19/2018.



ADMISSION LABS:  

1.  CBC from 12/18/2018, within normal limits, except hemoglobin was low at 12.1
, hematocrit was low at 38.0, MCH was low at 26.4, and MCHC was low at 31.8.  

2.  BMP was within normal limits.  

3.  Hemoglobin A1c within normal limits at 6.0.  

4.  Iron low at 26.0.  

5.  Iron saturation low at 7.  

6.  Liver function within normal limits.  

7.  Lipid panel within normal limits, except LDL cholesterol calculated was 
elevated at 112.  Non-HDL cholesterol was elevated at 131.  

8.  25-OH vitamin D total 35.8.  

9.  Toxicology screen negative for all substances screened and negative for 
ethyl alcohol.  

10.  Repeat hematocrit on 12/18/2018, was low at 39.1. 

11.  Absolute reticulocyte count was low at 0.040.  

12.  Percent reticulocyte count was low at 0.82.



MAJOR PROCEDURES OR TESTS:  None.



HOSPITAL COURSE:  The most prominent symptoms and behaviors while the patient 
was here were reports of severe depression with auditory hallucinations.  
Patient reported command hallucinations to be aggressive toward others.  The 
treatment modalities utilized were milieu and group therapy.  Seroquel 100 mg 
p.o. q.h.s. was started and titrated to 300 mg p.o. q.h.s. to target depression 
and psychosis symptoms, was tolerated with no report of side effects and with 
good response.  Prozac 20 mg p.o. daily was started and titrated to 40 mg p.o. 
daily to target depression and PTSD symptoms, was tolerated with no report of 
side effects and with good response.  Keppra 500 mg p.o. b.i.d. was continued 
to target seizure, was tolerated with no report of side effects and with good 
response.  Zonegran 100 mg p.o. q.h.s. was started for seizure, was tolerated 
with no report of side effects and with good response.  Patient has improved 
considerably, with no signs of psychiatric symptoms and no psychiatric symptoms 
expressed at time of discharge.  Patient reports he has improved since admission
, states to be in stable condition, feels safe to discharge, and he contracts 
for safety.  Patient's response to treatment was good.  There were no adverse 
or unexpected results of treatment.  The patient was safe throughout his stay, 
active in treatment, engaged in groups, and was appropriate with staff and 
other patients.  The patient met with the treatment team prior to discharge to 
assess readiness to discharge and review discharge plan.  The treatment team 
consensus is the patient is in stable condition, has a safe discharge plan, and 
is ready to discharge today.



CONDITION ON DISCHARGE:  Patient is in stable condition and is no longer a 
danger to self or others, and is not gravely disabled due to mental illness.  
Patient is no longer in need of inpatient level of care, and can be safely and 
effectively treated within the community.  The patients level of risk at time 
of discharge is low.  MSE: The patient is casually dressed and with good hygiene
, and looks stated age.  Patient is sitting, posture is upright, and position 
is relaxed.  Patient appears awake, alert, and responds appropriately and 
reasonably during interview.  Patient is engaged, relates well to interviewer, 
and emotional facial expression is appropriate to situation and changes 
appropriately with topic.  Patient is cooperative, makes comfortable eye contact
, and movements are voluntary, deliberate, coordinated, and smooth and even 
with no inappropriate movements.  Patient makes laryngeal sounds effortlessly 
and shares conversation appropriately; pace of conversation is appropriate, and 
stream of talking is fluent; articulation is clear and understandable; word 
choice is effortless and appropriate for education level; completes sentences, 
occasionally pausing to think; rate and volume are appropriate for interview 
and setting.  Patient reports mood as euthymic.  Patients affect is stable with 
full variable range, congruent with mood, and appropriate to speech and 
circumstances.  Patient has linear and logical thinking, with no loose 
associations, tangential thought, thought blocking, concrete thinking, or any 
other signs of formal thought disorder.  Patient denies suicidal and homicidal 
ideation, and denies hallucinations and delusions.  Patient appears to be a 
reliable historian with sound judgement and good insight into current 
condition.  Patient has no apparent dysfunction in recent or remote memory noted
, and no evidence of gross cognitive dysfunction noted at any point during the 
interview.



DISCHARGE DIAGNOSES:  

1.  Major depressive disorder, recurrent episode, severe, with mood congruent 
psychotic features.

2.  Posttraumatic stress disorder.  

3.  Epilepsy.



CURRENT MEDICATIONS:  After reviewing options, risks and benefits with the 
patient, the patient agrees to continue:

1.  Prozac 40 mg p.o. daily.

2.  Prazosin 2 mg p.o. q.h.s. 

3.  Zonegran 100 mg p.o. q.h.s. 

4.  Seroquel 300 mg p.o. q.h.s. 

5.  Keppra 500 mg p.o. b.i.d.  



The patient requests prescriptions for these medications at time of discharge.  
Prescriptions for 30 days for each of the above-listed medications are 
provided.  Prescriptions are reviewed with the patient at time of discharge to 
ensure accuracy and patient understanding.



DISPOSITION:  Patient left hospital independently and voluntarily with his wife 
and plans to return home.



FOLLOWUP:  Care coordinator reports the appropriate outpatient follow-up 
services have been established and outpatient appointments have been scheduled.
  The patient received written instructions with times and dates of outpatient 
follow-up appointments.  The following follow-up recommendations were provided 
to the patient at discharge: Continue psychotropic medications as prescribed 
and attend appointments as scheduled.  Report any side effects to a psychiatric 
outpatient provider, a primary care provider, or other health care 
professional.  Address any questions or problems concerning the psychotropic 
medications with a psychiatric outpatient provider, a primary care provider, or 
other health care professional.  Contact [facility] or [facility], or go 
to the nearest emergency room, if you are ever a danger to yourself/others, or 
unable to care for yourself.  As soon as possible, establish a routine 
medication management treatment with a psychiatric provider, establish routine 
therapy appointments, and follow-up with a primary care provider.  



LEGAL COURSE:  Patient was admitted voluntarily and discharged today 
independently and voluntarily.



ATTITUDE AT TIME OF DISCHARGE:  The patients attitude was positive at time of 
discharge, and patient reports looking forward to discharging today.  The 
patient reports he feels safe to discharge, is no longer a danger to himself or 
others, is in stable condition, and contracts for safety.  Patient states he 
will continue medications as prescribed, and establish medication management 
treatment with an outpatient provider after discharge.  Patient reports he 
understands the information that has been provided to him, and he understands, 
accepts, and agrees to psychotropic medications.  Patient describes internal 
protective factors as the coping skills he has learned while hospitalized here, 
and he plans to continue to practice these coping skills after discharge.  



LABS AND STUDIES:  There were no pending labs or studies at time of discharge.



ADVANCE DIRECTIVES:  There were no advance directives on file, and patient was 
full code during this hospitalization.



The following psychotropic medication treatment informed consent and 
recommendations were provided to the patient at time of discharge.  Patient 
reports he understands, accepts, and agrees to the information that has been 
provided.   



PSYCHOTROPIC MEDICATION TREATMENT INFORMED CONSENT and RECOMMENDATIONS: Review 
nature of condition, diagnosis, and prognosis.  Review nature and purpose of 
psychotropic medication treatment. Review risk and benefits of psychotropic 
medication treatment.  Review probable length of time will need to take 
medications.  Review risk and benefits of not undergoing psychotropic 
medication treatment.  Review alternative treatments to psychotropic 
medications.  Review psychotropic medications contraindications, side effects, 
and importance of reporting any side effects to a psychiatric provider, primary 
care provider, or other health care professional.  



Review safety plan and the importance to contact [facility] or [facility]
, or go to the nearest emergency room, if ever a danger to yourself/others, or 
unable to care for yourself.  Recommend upon discharge to establish routine 
medication management treatment with a psychiatric provider, establish routine 
therapy appointments, and follow-up with a primary care provider.  Verify 
patient understands, accepts, and agrees to the information that has been 
provided. 





Job #:  [id number]

MTDD

## 2019-03-08 ENCOUNTER — HOSPITAL ENCOUNTER (EMERGENCY)
Dept: HOSPITAL 80 - FED | Age: 39
Discharge: HOME | End: 2019-03-08
Payer: COMMERCIAL

## 2019-03-08 VITALS — SYSTOLIC BLOOD PRESSURE: 138 MMHG | DIASTOLIC BLOOD PRESSURE: 81 MMHG

## 2019-03-08 DIAGNOSIS — N20.0: ICD-10-CM

## 2019-03-08 DIAGNOSIS — N13.0: Primary | ICD-10-CM

## 2019-03-08 DIAGNOSIS — E86.9: ICD-10-CM

## 2019-03-08 LAB — PLATELET # BLD: 297 10^3/UL (ref 150–400)

## 2019-03-08 NOTE — EDPHY
H & P


Stated Complaint: n/v diarrhea l abd pain


Time Seen by Provider: 03/08/19 13:17


HPI/ROS: 


HPI:  This is a 39-year-old male who presents with





Chief Complaint:  n/v diarrhea l abd pain





Location:  Left flank


Quality:  Pain


Duration:  Since this morning


Signs and Symptoms: no fever, + nausea, + vomiting, no hematemesis, no blood in 

stool, no abdominal bloating, + diarrhea, no back pain, no urinary symptoms, no 

testicular/groin pain, no indigestion, no chest pain, no shortness of breath


Timing:  Acute onset, constant


Severity:  Moderate


Context:  Patient presents with sudden onset this morning of left flank pain 

that was radiating into his back accompanied by nausea and 1 episode of 

vomiting and diarrhea.  Patient denies any fever, difficulty urinating, dysuria

, blood in his urine.  Patient reports that he is able to eat and drink without 

difficulty.  No history of abdominal surgeries.  No history of kidney stones.  

Patient reports that he has had no heavy lifting and denies shortness of breath

, chest pain, palpitations.  He reports that the pain is better at this time.


Modifying Factors:  None





Comment: 








ROS:  A comprehensive 10 system review of systems is otherwise negative aside 

from elements mentioned in the history of present illness. 





MEDICAL/SURGICAL/SOCIAL HISTORY: 


Medical history:  Left spontaneous pneumothorax, depression, PTSD, seizures, FX 

SPINE, osteoporosis


Surgical history:  Deviated septum surgery


Social history:  Current every day smoker.  Family history noncontributory.








CONSTITUTIONAL:  Extremely well-appearing middle-aged white male, awake and 

alert, no obvious distress


HEENT: Atraumatic and normocephalic, PERRL, EOMI.  Nares patent; no rhinorrhea;

  no nasal mucosal edema. Tympanic membranes clear. Oropharynx clear, no 

exudate and moist pink mucosa.  Airway patent.  No lymphadenopathy.  No 

meningismus.


Cardiovascular: Normal S1/S2, regular rate, regular rhythm, without murmur rub 

or gallop.


PULMONARY/CHEST:  Symmetrical and nontender. Clear to auscultation bilaterally. 

Good air movement. No accessory muscle usage.


ABDOMEN:  Soft, nondistended, nontender, no rebound, no guarding, no peritoneal 

signs, no masses or organomegaly. No CVAT.


EXTREMITIES:  2/2 pulses, strength 5/5, no deformities, no clubbing, no 

cyanosis or edema.


NEUROLOGICAL: no focal neuro deficits.  GCS 15.


SKIN: Warm and dry, no erythema. no rash.  Good capillary refill. 








Source: Patient


Exam Limitations: No limitations





- Personal History


Current Tetanus Diphtheria and Acellular Pertussis (TDAP): Yes


Tetanus Vaccine Date: < 10 years





- Medical/Surgical History


Hx Asthma: No


Hx Chronic Respiratory Disease: No


Hx Diabetes: No


Hx Cardiac Disease: No


Hx Renal Disease: No


Hx Cirrhosis: No


Hx Alcoholism: Yes


Hx HIV/AIDS: No


Hx Splenectomy or Spleen Trauma: No


Other PMH: Left spontaneous pneumothorax,.  depression, PTSD, seizures, FX SPINE

, osteoporosis.  surg for deviated septum





- Social History


Smoking Status: Current every day smoker


Constitutional: 


 Initial Vital Signs











Temperature (C)  36.7 C   03/08/19 11:58


 


Heart Rate  87   03/08/19 11:58


 


Respiratory Rate  17   03/08/19 11:58


 


Blood Pressure  122/74 H  03/08/19 11:58


 


O2 Sat (%)  98   03/08/19 11:58








 











O2 Delivery Mode               Room Air














Allergies/Adverse Reactions: 


 





No Known Allergies Allergy (Verified 03/08/19 11:57)


 








Home Medications: 














 Medication  Instructions  Recorded


 


Acetaminophen [Tylenol 325mg (*)] 650 mg PO Q4HRS PRN  tab 12/21/18


 


Acetaminophen/ASA/Caffeine 1 each PO Q6HRS PRN  tab 12/21/18





[Excedrin Tablet (*)]  


 


FLUoxetine [Prozac 20 MG (*)] 40 mg PO DAILY 30 Days #60 cap 12/21/18


 


Ferrous Sulfate [Ferrous Sulf 325 325 mg PO DAILY  tab 12/21/18





MG (*)]  


 


Ibuprofen [Motrin (*)] 600 mg PO Q6HRS PRN  tab 12/21/18


 


Prazosin HCl [Minipress 1mg (*)] 2 mg PO HS 30 Days #60 cap 12/21/18


 


QUEtiapine FUMARATE [Seroquel 300 mg PO HS 30 Days #30 tab 12/21/18





300mg (*)]  


 


Zonisamide [Zonegran 100MG (*)] 100 mg PO HS 30 Days #30 cap 12/21/18


 


levETIRAcetam [Keppra 500 mg (*)] 500 mg PO BID 30 Days #60 tab 12/21/18


 


Ondansetron Odt [Zofran Odt 4 mg 4 mg PO Q4 PRN #12 tab 03/08/19





(*)]  


 


Tamsulosin HCl [Flomax 0.4 MG (*)] 0.4 mg PO DAILY #10 cap 03/08/19


 


oxyCODONE/APAP 5/325 [Percocet 1 - 2 tab PO Q4H PRN #10 tab 03/08/19





5/325 (*)]  














Medical Decision Making





- Diagnostics


Imaging Results: 


 Imaging Impressions





Abdomen/Pelvis CT  03/08/19 13:34


Impression:


1. Bilateral small renal stones, at about 2 mm in size each.


2. A 2 mm left ureterovesical junction stone basically protruding into the 

bladder, causing mild hydronephrosis.


 


Findings and recommendations discussed with Monika Menjiavr at 3:00 PM, 3/8/

2019.


 


Final report concurs with initial preliminary interpretation.


 


Attention:   This examination does not use radiographic contrast, and as such, 

provides only a limited evaluation of the abdomen, pelvis, and retroperitoneum.

  If there is further clinical suspicion for pathological conditions, a 

complete CT evaluation of the abdomen and pelvis utilizing intravenous, oral, 

and rectal contrast should be considered.


 











ED Course/Re-evaluation: 


Vital signs reviewed and stable upon arrival.  No systemic signs.


IV access, laboratory studies, urinalysis, CT abdomen and pelvis scan without 

contrast ordered


Given 1 L normal saline


Patient currently has no pain and politely declined any pain medication.


1400:  Labs reviewed.  WBC 12 K, H&H 11.3/35.6, creatinine 1.5, glucose 167


1515:  Called By Radiology, Dr. Hidalgo, advised that CT abdomen and pelvis scan 

without contrast shows 2 mm left ureterovesical junction stone basically 

protruding into the bladder, causing mild hydronephrosis.


Patient is currently pain-free and urinating without difficulty.


Given Flomax, Percocet, Zofran with Urology follow-up.








This patient was seen under the supervision of my secondary supervising 

physician.  I evaluated care for this patient with attending.  Discussed this 

patient with Dr. Chau.  





Differential Diagnosis: 


Flank pain including but not limited to musculoskeletal causes, kidney stone, 

pyelonephritis, shingles, and intra-abdominal causes such as diverticulitis and 

appendicitis.








- Data Points


Laboratory Results: 


 Laboratory Results





 03/08/19 13:30 





 03/08/19 13:30 





 











  03/08/19 03/08/19





  13:30 13:30


 


WBC    11.47 10^3/uL H 10^3/uL





    (3.80-9.50) 


 


RBC    4.60 10^6/uL 10^6/uL





    (4.40-6.38) 


 


Hgb    11.3 g/dL L g/dL





    (13.7-17.5) 


 


Hct    35.6 % L %





    (40.0-51.0) 


 


MCV    77.4 fL L fL





    (81.5-99.8) 


 


MCH    24.6 pg L pg





    (27.9-34.1) 


 


MCHC    31.7 g/dL L g/dL





    (32.4-36.7) 


 


RDW    15.0 % %





    (11.5-15.2) 


 


Plt Count    297 10^3/uL 10^3/uL





    (150-400) 


 


MPV    10.1 fL fL





    (8.7-11.7) 


 


Neut % (Auto)    Not Reported 





   


 


Lymph % (Auto)    Not Reported 





   


 


Mono % (Auto)    Not Reported 





   


 


Eos % (Auto)    Not Reported 





   


 


Baso % (Auto)    Not Reported 





   


 


Nucleat RBC Rel Count    Not Reported 





   


 


Absolute Neuts (auto)    Not Reported 





   


 


Absolute Lymphs (auto)    Not Reported 





   


 


Absolute Monos (auto)    Not Reported 





   


 


Absolute Eos (auto)    Not Reported 





   


 


Absolute Basos (auto)    Not Reported 





   


 


Absolute Nucleated RBC    Not Reported 





   


 


Immature Gran %    Not Reported 





   


 


Seg Neutrophils %    86.9 % %





   


 


Band Neutrophils %    0.0 % %





   


 


Lymphocytes %    8.1 % %





   


 


Monocytes %    3.0 % %





   


 


Eosinophils %    2.0 % %





   


 


Basophils %    0.0 % %





   


 


Metamyelocytes %    0.0 % %





   


 


Myelocytes %    0.0 % %





   


 


Promyelocytes %    0.0 % %





   


 


Blast Cells %    0.0 % %





   


 


Immature Gran #    Not Reported 





   


 


Absolute Seg Neuts    9.97 10^3/uL H 10^3/uL





    (1.70-6.50) 


 


Absolute Band Neuts    0.00 10^3/uL 10^3/uL





    (0.00-0.70) 


 


Absolute Lymphocytes    0.93 10^3/uL L 10^3/uL





    (1.00-3.00) 


 


Absolute Monocytes    0.34 10^3/uL 10^3/uL





    (0.30-0.80) 


 


Absolute Eosinophils    0.23 10^3/uL 10^3/uL





    (0.03-0.40) 


 


Absolute Basophils    0.00 10^3/uL L 10^3/uL





    (0.02-0.10) 


 


Absolute Metamyelocyte    0.00 10^3/mL 10^3/mL





    (0.00-0.00) 


 


Absolute Myelocytes    0.00 10^3/mL 10^3/mL





    (0.00-0.00) 


 


Absolute Promyelocytes    0.00 10^3/uL 10^3/uL





    (0.00-0.00) 


 


Absolute Plasma Cells    0.00 10^3/uL 10^3/uL





    (0.00-0.00) 


 


Nucleated RBCs    0 /100 WBC /100 WBC





    (0-0) 


 


Absolute Blast Cells    0.00 10^3/uL 10^3/uL





    (0.00-0.00) 


 


Plasma Cells %    0.0 % %





   


 


Platelet Estimate    ADEQUATE 





    (ADEQ) 


 


Polychromasia    1+  H 





   


 


Sodium  138 mEq/L mEq/L  





   (135-145)  


 


Potassium  4.1 mEq/L mEq/L  





   (3.5-5.2)  


 


Chloride  106 mEq/L mEq/L  





   ()  


 


Carbon Dioxide  23 mEq/l mEq/l  





   (22-31)  


 


Anion Gap  9 mEq/L mEq/L  





   (6-14)  


 


BUN  18 mg/dL mg/dL  





   (7-23)  


 


Creatinine  1.5 mg/dL H mg/dL  





   (0.7-1.3)  


 


Estimated GFR  52   





   


 


Glucose  167 mg/dL H mg/dL  





   ()  


 


Calcium  8.9 mg/dL mg/dL  





   (8.5-10.4)  


 


Total Bilirubin  0.2 mg/dL mg/dL  





   (0.1-1.4)  


 


Conjugated Bilirubin  0.2 mg/dL mg/dL  





   (0.0-0.5)  


 


Unconjugated Bilirubin  0.0 mg/dL mg/dL  





   (0.0-1.1)  


 


AST  35 IU/L IU/L  





   (17-59)  


 


ALT  56 IU/L IU/L  





   (21-72)  


 


Alkaline Phosphatase  88 IU/L IU/L  





   ()  


 


Total Protein  7.2 g/dL g/dL  





   (6.3-8.2)  


 


Albumin  3.9 g/dL g/dL  





   (3.5-5.0)  


 


Lipase  110 IU/L IU/L  





   ()  











Medications Given: 


 








Discontinued Medications





Sodium Chloride (Ns)  1,000 mls @ 0 mls/hr IV ONCE ONE; Wide Open


   PRN Reason: Protocol


   Stop: 03/08/19 13:35


   Last Admin: 03/08/19 13:35 Dose:  1,000 mls








Departure





- Departure


Disposition: Home, Routine, Self-Care


Clinical Impression: 


 Nephrolithiasis, Ureterolithiasis, Renal colic on left side





Condition: Good


Instructions:  Renal Colic (ED), Ureteral Stones (ED)


Additional Instructions: 


Consume a minimum of 8-10 glasses of water or electrolyte fluid replacement 

drinks that include Gatorade, Powerade, Pedialyte. 


Take Flomax daily until stone passes.


Take Zofran every 4-6 hours as needed for nausea, vomiting.


Strain all of your urine until your stone passes.


Take Tylenol 650 mg every 4 hours and/or Ibuprofen 600 mg every 8 hours with 

food as needed for pain. 


Use Percocet every 6 hours as needed for severe/break through pain.


Do not use Tylenol and Percocet concomitantly. 


Follow-up with Urology in the next 1-2 weeks.


Referrals: 


Jennifer Talavera MD [Medical Doctor] - As per Instructions


Prescriptions: 


Ondansetron Odt [Zofran Odt 4 mg (*)] 4 mg PO Q4 PRN #12 tab


 PRN Reason: Nausea/Vomiting, Use 1st


oxyCODONE/APAP 5/325 [Percocet 5/325 (*)] 1 - 2 tab PO Q4H PRN #10 tab


 PRN Reason: Pain, Severe


Tamsulosin HCl [Flomax 0.4 MG (*)] 0.4 mg PO DAILY #10 cap

## 2019-03-13 ENCOUNTER — HOSPITAL ENCOUNTER (EMERGENCY)
Dept: HOSPITAL 80 - FED | Age: 39
Discharge: HOME | End: 2019-03-13
Payer: COMMERCIAL

## 2019-03-13 VITALS — DIASTOLIC BLOOD PRESSURE: 85 MMHG | SYSTOLIC BLOOD PRESSURE: 112 MMHG

## 2019-03-13 DIAGNOSIS — G40.909: Primary | ICD-10-CM

## 2019-03-13 LAB — PLATELET # BLD: 491 10^3/UL (ref 150–400)

## 2019-03-13 PROCEDURE — G0480 DRUG TEST DEF 1-7 CLASSES: HCPCS

## 2019-03-13 NOTE — EDPHY
H & P


Stated Complaint: seizure


Time Seen by Provider: 03/13/19 17:18


HPI/ROS: 


Chief Complaint:  Seizure





HPI:  39 year old male presents via EMS after a witnessed seizure at work.  

Patient can provide only limited history as he is post-ictal.  Initial 

evaluation and history limited as hospital computers are down due to computer 

malfunction.


Patient reports feeling "fine" prior to seizure.  States he has a history of 

seizures and takes Keppra.  Denies drug use or alcohol use.





No fever, chills, chest pain, shortness of breath, palpitations, vomiting, 

diarrhea, urinary complaints, headache, lightheadedness. 





REVIEW OF SYSTEMS:Aside from elements discussed in the HPI, a comprehensive 10-

point review of systems was reviewed and is negative.  Note patient somewhat 

irritable and sleepy when answering questions.  Oriented only to person and 

place so ROS may be limited.





PAST MEDICAL HISTORY:  Seizures.  (Reports mental health diagnosis and kidney 

stone diagnosis when more lucid.)





SOCIAL HISTORY: Works as a .  Denies illicit drug use.





VITAL SIGNS:  see nurses notes.  


GENERAL: Well-developed, well-nourished, in no acute distress. No head or 

facial trauma noted.


HEENT: Normal, no discharge or icterus, moist mucous membranes. No intra oral 

trauma noted.  Neck: supple, FROM.  Nontender to palpation.  No menigisus, neg 

kernig, neg brudzinski.


LUNGS: Clear to auscultation bilaterally, no wheezes, rhonchi or rales.


CARDIAC: Tachycardic rate and regular rhythm, no rubs, murmurs or gallops.


ABDOMEN: Soft, nontender, nondistended, bowel sounds normal.


BACK:  No CVA tenderness. No vertebral tenderness. 


EXTREMITIES: No edema, FROM. No trauma.


NEURO: Alert and oriented x 2.  WOMACK equally, PERRL, EOMI, no nystagmus, grossly 

nonfocal.  


SKIN: Warm and dry, no rash.





- Personal History


Tetanus Vaccine Date: < 10 years





- Medical/Surgical History


Hx Asthma: No


Hx Chronic Respiratory Disease: No


Hx Diabetes: No


Hx Cardiac Disease: No


Hx Renal Disease: No


Hx Cirrhosis: No


Hx Alcoholism: Yes


Hx HIV/AIDS: No


Hx Splenectomy or Spleen Trauma: No


Other PMH: Left spontaneous pneumothorax,.  depression, PTSD, seizures, FX SPINE

, osteoporosis.  surg for deviated septum





- Social History


Smoking Status: Current every day smoker


Constitutional: 


 Initial Vital Signs











Heart Rate  119 H  03/13/19 17:18


 


Respiratory Rate  17   03/13/19 17:18


 


Blood Pressure  125/60 H  03/13/19 17:18


 


O2 Sat (%)  94   03/13/19 17:18








 











O2 Delivery Mode               Room Air


 


O2 (L/minute)                  2














Allergies/Adverse Reactions: 


 





No Known Allergies Allergy (Verified 03/08/19 11:57)


 








Home Medications: 














 Medication  Instructions  Recorded


 


Acetaminophen [Tylenol 325mg (*)] 650 mg PO Q4HRS PRN  tab 12/21/18


 


Acetaminophen/ASA/Caffeine 1 each PO Q6HRS PRN  tab 12/21/18





[Excedrin Tablet (*)]  


 


FLUoxetine [Prozac 20 MG (*)] 40 mg PO DAILY 30 Days #60 cap 12/21/18


 


Ferrous Sulfate [Ferrous Sulf 325 325 mg PO DAILY  tab 12/21/18





MG (*)]  


 


Ibuprofen [Motrin (*)] 600 mg PO Q6HRS PRN  tab 12/21/18


 


Prazosin HCl [Minipress 1mg (*)] 2 mg PO HS 30 Days #60 cap 12/21/18


 


QUEtiapine FUMARATE [Seroquel 300 mg PO HS 30 Days #30 tab 12/21/18





300mg (*)]  


 


Zonisamide [Zonegran 100MG (*)] 100 mg PO HS 30 Days #30 cap 12/21/18


 


levETIRAcetam [Keppra 500 mg (*)] 500 mg PO BID 30 Days #60 tab 12/21/18


 


Ondansetron Odt [Zofran Odt 4 mg 4 mg PO Q4 PRN #12 tab 03/08/19





(*)]  


 


Tamsulosin HCl [Flomax 0.4 MG (*)] 0.4 mg PO DAILY #10 cap 03/08/19


 


oxyCODONE/APAP 5/325 [Percocet 1 - 2 tab PO Q4H PRN #10 tab 03/08/19





5/325 (*)]  














Medical Decision Making





- Diagnostics


Imaging Results: 


CT Head:


Impression: There is no acute intracranial abnormality identified on this 

unenhanced CT evaluation, or substantial change from previous studies in 2018. 

If there is further clinical concern regarding the patient's symptoms, MR 

imaging is suggested, if not otherwise contraindicated. Findings were discussed 

with Yaquelin, the medical scribe for Simran Nguyen MD at 18:36, on 3/13/ 2019. 

Dictated By: Dima Robbins MD 


Imaging: Discussed imaging studies w/ On call Radiologist


ED Course/Re-evaluation: 





Patient had a witnessed seizure shortly after arrival.


Ativan 2 mg given.


Head CT ordered.





18:36 Spoke with Dr. Robbins, radiologist. CT head is negative for acute 

processes. 


Labs all reassuring except depressed bicarb consistent with seizure previously, 

as well as CBC differential.  Will discuss follow up with patient when no 

longer post ictal.





Urine tox neg.  Alcohol neg.





Patient unable to tell me dose of Keppra.  Given additional 500mg po Keppra.





Mentation cleared appropriately.  Discharged to his wife.  VS stabilitzed and 

HR trended downwards.





See DC instructions.


Differential Diagnosis: 





Diff dx considerd included but not limited to breakthru seizure, medication 

noncompliance, drug or alchohol intoxication or withdrawal, head trauma, 

infection, sleep deprivation, intracranial pathology.





- Data Points


Laboratory Results: 


 Laboratory Results





 03/13/19 17:35 





 03/13/19 17:33 








Medications Given: 


 








Discontinued Medications





Levetiracetam (Keppra)  500 mg PO EDNOW ONE


   Stop: 03/13/19 19:48


   Last Admin: 03/13/19 20:03 Dose:  500 mg


Lorazepam (Ativan Injection)  2 mg IVP EDNOW ONE


   Stop: 03/13/19 17:51


   Last Admin: 03/13/19 17:52 Dose:  2 mg








Departure





- Departure


Disposition: Home, Routine, Self-Care


Clinical Impression: 


 Seizure disorder





Condition: Good


Instructions:  Recurrent Seizures in Adults (ED)


Additional Instructions: 


Please follow up with your neurologist as soon as possible.





Continue taking your Keppra as directed.





No driving until you are seen by Neurology.





Follow up with your PCP concerning your blood studies.





Referrals: 


Patient,NotPresent [Unknown] - As per Instructions


Jn Carlin MD [Medical Doctor] - As per Instructions

## 2019-04-01 ENCOUNTER — HOSPITAL ENCOUNTER (EMERGENCY)
Dept: HOSPITAL 80 - FED | Age: 39
Discharge: HOME | End: 2019-04-01
Payer: SELF-PAY

## 2019-04-01 VITALS — SYSTOLIC BLOOD PRESSURE: 108 MMHG | DIASTOLIC BLOOD PRESSURE: 61 MMHG

## 2019-04-01 DIAGNOSIS — N30.90: ICD-10-CM

## 2019-04-01 DIAGNOSIS — R31.0: Primary | ICD-10-CM

## 2019-04-01 NOTE — EDPHY
H & P


Stated Complaint: Reddish urine


Time Seen by Provider: 04/01/19 13:46





- Personal History


Tetanus Vaccine Date: < 10 years





- Medical/Surgical History


Hx Asthma: No


Hx Chronic Respiratory Disease: No


Hx Diabetes: No


Hx Cardiac Disease: No


Hx Renal Disease: No


Hx Cirrhosis: No


Hx Alcoholism: Yes


Hx HIV/AIDS: No


Hx Splenectomy or Spleen Trauma: No


Other PMH: Left spontaneous pneumothorax,.  depression, PTSD, seizures, FX SPINE

, osteoporosis.  surg for deviated septum





- Social History


Smoking Status: Current every day smoker


Constitutional: 


 Initial Vital Signs











Temperature (C)  36.9 C   04/01/19 13:44


 


Heart Rate  88   04/01/19 13:44


 


Respiratory Rate  16   04/01/19 13:44


 


Blood Pressure  108/61   04/01/19 13:44


 


O2 Sat (%)  99   04/01/19 13:44








 











O2 Delivery Mode               Room Air














Allergies/Adverse Reactions: 


 





No Known Allergies Allergy (Verified 04/01/19 13:44)


 








Home Medications: 














 Medication  Instructions  Recorded


 


Acetaminophen [Tylenol 325mg (*)] 650 mg PO Q4HRS PRN  tab 12/21/18


 


Acetaminophen/ASA/Caffeine 1 each PO Q6HRS PRN  tab 12/21/18





[Excedrin Tablet (*)]  


 


FLUoxetine [Prozac 20 MG (*)] 40 mg PO DAILY 30 Days #60 cap 12/21/18


 


Ferrous Sulfate [Ferrous Sulf 325 325 mg PO DAILY  tab 12/21/18





MG (*)]  


 


Ibuprofen [Motrin (*)] 600 mg PO Q6HRS PRN  tab 12/21/18


 


Prazosin HCl [Minipress 1mg (*)] 2 mg PO HS 30 Days #60 cap 12/21/18


 


QUEtiapine FUMARATE [Seroquel 300 mg PO HS 30 Days #30 tab 12/21/18





300mg (*)]  


 


Zonisamide [Zonegran 100MG (*)] 100 mg PO HS 30 Days #30 cap 12/21/18


 


levETIRAcetam [Keppra 500 mg (*)] 500 mg PO BID 30 Days #60 tab 12/21/18


 


Ondansetron Odt [Zofran Odt 4 mg 4 mg PO Q4 PRN #12 tab 03/08/19





(*)]  


 


Tamsulosin HCl [Flomax 0.4 MG (*)] 0.4 mg PO DAILY #10 cap 03/08/19


 


oxyCODONE/APAP 5/325 [Percocet 1 - 2 tab PO Q4H PRN #10 tab 03/08/19





5/325 (*)]  


 


Cephalexin [Keflex (RX)] 500 mg PO TID #30 cap 04/01/19














Medical Decision Making


ED Course/Re-evaluation: 





CHIEF COMPLAINT: Blood in urine





HISTORY OF PRESENT ILLNESS: 


The patient is a 38 y/o male complaining of having one episode of red/pink 

urine. The patient denies painful urination, frequent urination, rash on his 

penis, swollen lymph noes, unprotected sexual intercourse, abnormal discharge. 

He cannot recall if he ate any red food like beets recently. No fever, headache

, body aches, lightheadedness, chest pain, heart palpitations, shortness of 

breath, cough, abdominal pain, bowel complaints, numbness, paresthesias.





REVIEW OF SYSTEMS:  





A comprehensive 10 system review of systems is otherwise negative aside from 

elements mentioned in the history of present illness and medical decision 

making.





PHYSICAL EXAM:  





HR, BP, O2 Sat, RR.  Temp noted


General Appearance:  Alert, well hydrated, appropriate, and non-toxic appearing.


Head:  Atraumatic without scalp tenderness or obvious injury


Eyes:  Pupils equal, round, reactive to light and accommodation, EOMI, no trauma

, no injection.


Ears:  Clear bilaterally, no perforation, normal landmarks


Nose:  Atraumatic, no rhinorrhea, clear.


Throat:  There is no erythema or exudates, no lesions, normal tonsils, mucus 

membranes moist.


Neck:  Supple, 2+ carotid upstroke, nontender, no lymphadenopathy.


Respiratory:  No retractions, no distress, no wheezes, and no accessory muscle 

use.  Lungs are clear to auscultation bilaterally.


Cardiovascular:  Regular rate and rhythm, no murmurs, rubs, or gallops. 

Bilateral carotid, radial, dorsalis pedis, and posterior tibial pulses intact. 

Good capillary refill all extremities.


Gastrointestinal:  Abdomen is soft, nontender, non-distended, no masses, no 

rebound, no guarding, no peritoneal signs.


Musculoskeletal:  Normal active ROM of all extremities, atraumatic.


Neurological:  Alert, appropriate, and interactive.  The patient has normal 

DTRs and non-focal cranial nerves, motor, sensory, and cerebellar exam.


Skin:  No rashes, good turgor, no nodules on palpation.





Past medical history: Left spontaneous pneumothorax, depression, PTSD, seizures

, spine fracture, osteoporosis


Past surgical history: Deviated septum repair


Family history: Denies


Social history: Lives in [place], single, employed





DIAGNOSTICS/PROCEDURES/CRITICAL CARE TIME:  


Not indicated.





DIFFERENTIAL DIAGNOSIS:   


The differential diagnosis for the patient's urinary symptoms included but was 

not limited to prostatitis, cystitis, medication side effect, neurologic causes

, and infection.





MEDICAL DECISION MAKING:  


The patient is a 38 y/o male complaining of having one episode of red/pink 

urine. The patient denies painful urination, frequent urination, rash on his 

penis, swollen lymph noes, unprotected sexual intercourse, abnormal discharge. 

The patient has a normal physical exam. UA ordered.





1435: Patient's UA reveals RBC's and WBC's, however he insists he is not having 

any pain. I do not suspect he is having a kidney stone as he is having no pain. 

His symptoms are consistent with a prostatitis or cystitis.





1440: Reassessed patient and discussed UA findings. I have prescribed him 

Keflex for the cystitis and advised him to follow up with a urologist. Return 

precautions provided; patient is comfortable with this plan. 








- Data Points


Laboratory Results: 


 











  04/01/19 04/01/19





  14:18 14:18


 


Urine Color    RED 





   


 


Urine Appearance    MODERATELY TURBID 





   


 


Urine pH    6.0 





    (5.0-7.5) 


 


Ur Specific Gravity    1.019 





    (1.002-1.030) 


 


Urine Protein    2+  H 





    (NEGATIVE) 


 


Urine Ketones    NEGATIVE 





    (NEGATIVE) 


 


Urine Blood    3+  H 





    (NEGATIVE) 


 


Urine Nitrate    NEGATIVE 





    (NEGATIVE) 


 


Urine Bilirubin    NEGATIVE 





    (NEGATIVE) 


 


Urine Urobilinogen    NEGATIVE EU EU





    (0.2-1.0) 


 


Ur Leukocyte Esterase    NEGATIVE 





    (NEGATIVE) 


 


Urine RBC     /hpf H /hpf





    (0-3) 


 


Urine WBC    25-50 /hpf H /hpf





    (0-3) 


 


Ur Epithelial Cells    NONE SEEN /lpf /lpf





    (NONE-1+) 


 


Urine Bacteria    2+ /hpf H /hpf





    (NONE SEEN) 


 


Urine Mucus    TRACE /lpf /lpf





    (NONE-1+) 


 


Urine Yeast    PRESENT /hpf /hpf





    (NONE SEEN) 


 


Urine Glucose    NEGATIVE 





    (NEGATIVE) 


 


C.trachomatis RNA (TMA)  Pending   





   


 


N.gonorrhoeae RNA (TMA)  Pending   





   














Departure





- Departure


Disposition: Home, Routine, Self-Care


Clinical Impression: 


 Cystitis





Blood in urine


Qualifiers:


 Hematuria type: gross Qualified Code(s): R31.0 - Gross hematuria





Condition: Good


Instructions:  Urinary Tract Infection in Men (ED), Hematuria (ED)


Additional Instructions: 


1. Take Keflex as prescribed.


2. Follow-up with your primary doctor within 72 hours.  


3. Return to the Emergency Department for fever, worsening pain, flank pain or 

failure to improve within 72 hours.  





Referrals: 


Wan Dyson MD [Medical Doctor] - As per Instructions


Prescriptions: 


Cephalexin [Keflex (RX)] 500 mg PO TID #30 cap


Report Scribed for: Jong Chau


Report Scribed by: Rajni Estrella


Date of Report: 04/01/19


Time of Report: 13:48

## 2019-04-02 LAB — GC AMPLIFICATION GENPROBE: NEGATIVE
